# Patient Record
Sex: FEMALE | Race: WHITE | NOT HISPANIC OR LATINO | URBAN - METROPOLITAN AREA
[De-identification: names, ages, dates, MRNs, and addresses within clinical notes are randomized per-mention and may not be internally consistent; named-entity substitution may affect disease eponyms.]

---

## 2019-01-01 ENCOUNTER — INPATIENT (INPATIENT)
Facility: HOSPITAL | Age: 74
LOS: 3 days | DRG: 64 | End: 2019-09-15
Attending: NEUROLOGICAL SURGERY | Admitting: NEUROLOGICAL SURGERY
Payer: MEDICARE

## 2019-01-01 VITALS
DIASTOLIC BLOOD PRESSURE: 53 MMHG | HEART RATE: 150 BPM | TEMPERATURE: 96 F | SYSTOLIC BLOOD PRESSURE: 65 MMHG | OXYGEN SATURATION: 92 % | WEIGHT: 207.23 LBS | RESPIRATION RATE: 18 BRPM

## 2019-01-01 VITALS — RESPIRATION RATE: 9 BRPM

## 2019-01-01 DIAGNOSIS — I62.9 NONTRAUMATIC INTRACRANIAL HEMORRHAGE, UNSPECIFIED: ICD-10-CM

## 2019-01-01 DIAGNOSIS — R09.89 OTHER SPECIFIED SYMPTOMS AND SIGNS INVOLVING THE CIRCULATORY AND RESPIRATORY SYSTEMS: ICD-10-CM

## 2019-01-01 LAB
ALBUMIN SERPL ELPH-MCNC: 1.5 G/DL — LOW (ref 3.3–5)
ALBUMIN SERPL ELPH-MCNC: 2.2 G/DL — LOW (ref 3.3–5)
ALP SERPL-CCNC: 68 U/L — SIGNIFICANT CHANGE UP (ref 40–120)
ALP SERPL-CCNC: 91 U/L — SIGNIFICANT CHANGE UP (ref 40–120)
ALT FLD-CCNC: 2778 U/L — HIGH (ref 10–45)
ALT FLD-CCNC: 4691 U/L — HIGH (ref 10–45)
ANION GAP SERPL CALC-SCNC: 14 MMOL/L — SIGNIFICANT CHANGE UP (ref 5–17)
ANION GAP SERPL CALC-SCNC: 16 MMOL/L — SIGNIFICANT CHANGE UP (ref 5–17)
ANION GAP SERPL CALC-SCNC: 18 MMOL/L — HIGH (ref 5–17)
ANION GAP SERPL CALC-SCNC: 20 MMOL/L — HIGH (ref 5–17)
ANION GAP SERPL CALC-SCNC: 9 MMOL/L — SIGNIFICANT CHANGE UP (ref 5–17)
APTT BLD: 47.4 SEC — HIGH (ref 27.5–36.3)
APTT BLD: 48.9 SEC — HIGH (ref 27.5–36.3)
AST SERPL-CCNC: 2272 U/L — HIGH (ref 10–40)
AST SERPL-CCNC: 2430 U/L — HIGH (ref 10–40)
BASE EXCESS BLDA CALC-SCNC: -0.6 MMOL/L — SIGNIFICANT CHANGE UP (ref -2–3)
BASE EXCESS BLDA CALC-SCNC: -10.2 MMOL/L — LOW (ref -2–3)
BASE EXCESS BLDA CALC-SCNC: -2.8 MMOL/L — LOW (ref -2–3)
BASE EXCESS BLDA CALC-SCNC: -6.8 MMOL/L — LOW (ref -2–3)
BASE EXCESS BLDA CALC-SCNC: -7.5 MMOL/L — LOW (ref -2–3)
BILIRUB DIRECT SERPL-MCNC: 0.2 MG/DL — SIGNIFICANT CHANGE UP (ref 0–0.2)
BILIRUB DIRECT SERPL-MCNC: <0.2 MG/DL — SIGNIFICANT CHANGE UP (ref 0–0.2)
BILIRUB INDIRECT FLD-MCNC: 0.2 MG/DL — SIGNIFICANT CHANGE UP (ref 0.2–1)
BILIRUB INDIRECT FLD-MCNC: >0.2 MG/DL — SIGNIFICANT CHANGE UP (ref 0.2–1)
BILIRUB SERPL-MCNC: 0.4 MG/DL — SIGNIFICANT CHANGE UP (ref 0.2–1.2)
BILIRUB SERPL-MCNC: 0.4 MG/DL — SIGNIFICANT CHANGE UP (ref 0.2–1.2)
BLD GP AB SCN SERPL QL: NEGATIVE — SIGNIFICANT CHANGE UP
BUN SERPL-MCNC: 17 MG/DL — SIGNIFICANT CHANGE UP (ref 7–23)
BUN SERPL-MCNC: 18 MG/DL — SIGNIFICANT CHANGE UP (ref 7–23)
BUN SERPL-MCNC: 22 MG/DL — SIGNIFICANT CHANGE UP (ref 7–23)
BUN SERPL-MCNC: 24 MG/DL — HIGH (ref 7–23)
BUN SERPL-MCNC: 24 MG/DL — HIGH (ref 7–23)
CALCIUM SERPL-MCNC: 6.2 MG/DL — CRITICAL LOW (ref 8.4–10.5)
CALCIUM SERPL-MCNC: 6.3 MG/DL — CRITICAL LOW (ref 8.4–10.5)
CALCIUM SERPL-MCNC: 6.4 MG/DL — CRITICAL LOW (ref 8.4–10.5)
CALCIUM SERPL-MCNC: 6.5 MG/DL — CRITICAL LOW (ref 8.4–10.5)
CALCIUM SERPL-MCNC: 7.6 MG/DL — LOW (ref 8.4–10.5)
CHLORIDE SERPL-SCNC: 110 MMOL/L — HIGH (ref 96–108)
CHLORIDE SERPL-SCNC: 118 MMOL/L — HIGH (ref 96–108)
CHLORIDE SERPL-SCNC: 118 MMOL/L — HIGH (ref 96–108)
CHLORIDE SERPL-SCNC: 122 MMOL/L — HIGH (ref 96–108)
CHLORIDE SERPL-SCNC: 126 MMOL/L — HIGH (ref 96–108)
CHOLEST SERPL-MCNC: 112 MG/DL — SIGNIFICANT CHANGE UP (ref 10–199)
CO2 SERPL-SCNC: 13 MMOL/L — LOW (ref 22–31)
CO2 SERPL-SCNC: 14 MMOL/L — LOW (ref 22–31)
CO2 SERPL-SCNC: 15 MMOL/L — LOW (ref 22–31)
CO2 SERPL-SCNC: 22 MMOL/L — SIGNIFICANT CHANGE UP (ref 22–31)
CO2 SERPL-SCNC: 23 MMOL/L — SIGNIFICANT CHANGE UP (ref 22–31)
CREAT SERPL-MCNC: 0.23 MG/DL — LOW (ref 0.5–1.3)
CREAT SERPL-MCNC: 0.28 MG/DL — LOW (ref 0.5–1.3)
CREAT SERPL-MCNC: 0.32 MG/DL — LOW (ref 0.5–1.3)
CREAT SERPL-MCNC: 0.43 MG/DL — LOW (ref 0.5–1.3)
CREAT SERPL-MCNC: 0.55 MG/DL — SIGNIFICANT CHANGE UP (ref 0.5–1.3)
EXTRA SST TUBE: SIGNIFICANT CHANGE UP
GAS PNL BLDA: SIGNIFICANT CHANGE UP
GAS PNL BLDA: SIGNIFICANT CHANGE UP
GLUCOSE BLDC GLUCOMTR-MCNC: 100 MG/DL — HIGH (ref 70–99)
GLUCOSE BLDC GLUCOMTR-MCNC: 148 MG/DL — HIGH (ref 70–99)
GLUCOSE BLDC GLUCOMTR-MCNC: 207 MG/DL — HIGH (ref 70–99)
GLUCOSE BLDC GLUCOMTR-MCNC: 231 MG/DL — HIGH (ref 70–99)
GLUCOSE BLDC GLUCOMTR-MCNC: 328 MG/DL — HIGH (ref 70–99)
GLUCOSE BLDC GLUCOMTR-MCNC: 95 MG/DL — SIGNIFICANT CHANGE UP (ref 70–99)
GLUCOSE SERPL-MCNC: 158 MG/DL — HIGH (ref 70–99)
GLUCOSE SERPL-MCNC: 232 MG/DL — HIGH (ref 70–99)
GLUCOSE SERPL-MCNC: 232 MG/DL — HIGH (ref 70–99)
GLUCOSE SERPL-MCNC: 319 MG/DL — HIGH (ref 70–99)
GLUCOSE SERPL-MCNC: 320 MG/DL — HIGH (ref 70–99)
HBA1C BLD-MCNC: 4.6 % — SIGNIFICANT CHANGE UP (ref 4–5.6)
HCO3 BLDA-SCNC: 14 MMOL/L — LOW (ref 21–28)
HCO3 BLDA-SCNC: 17 MMOL/L — LOW (ref 21–28)
HCO3 BLDA-SCNC: 18 MMOL/L — LOW (ref 21–28)
HCO3 BLDA-SCNC: 20 MMOL/L — LOW (ref 21–28)
HCO3 BLDA-SCNC: 22 MMOL/L — SIGNIFICANT CHANGE UP (ref 21–28)
HCT VFR BLD CALC: 19.4 % — CRITICAL LOW (ref 34.5–45)
HCT VFR BLD CALC: 30 % — LOW (ref 34.5–45)
HCT VFR BLD CALC: 34 % — LOW (ref 34.5–45)
HCT VFR BLD CALC: 37.3 % — SIGNIFICANT CHANGE UP (ref 34.5–45)
HCV AB S/CO SERPL IA: 0.18 S/CO — SIGNIFICANT CHANGE UP
HCV AB SERPL-IMP: SIGNIFICANT CHANGE UP
HDLC SERPL-MCNC: 34 MG/DL — LOW
HGB BLD-MCNC: 11.3 G/DL — LOW (ref 11.5–15.5)
HGB BLD-MCNC: 12.1 G/DL — SIGNIFICANT CHANGE UP (ref 11.5–15.5)
HGB BLD-MCNC: 6 G/DL — CRITICAL LOW (ref 11.5–15.5)
HGB BLD-MCNC: 9.6 G/DL — LOW (ref 11.5–15.5)
INR BLD: 2.41 — HIGH (ref 0.88–1.16)
INR BLD: 2.44 — HIGH (ref 0.88–1.16)
LACTATE SERPL-SCNC: 11.5 MMOL/L — CRITICAL HIGH (ref 0.5–2)
LACTATE SERPL-SCNC: 2.2 MMOL/L — HIGH (ref 0.5–2)
LACTATE SERPL-SCNC: 4.1 MMOL/L — CRITICAL HIGH (ref 0.5–2)
LACTATE SERPL-SCNC: 6.8 MMOL/L — CRITICAL HIGH (ref 0.5–2)
LACTATE SERPL-SCNC: 8.4 MMOL/L — CRITICAL HIGH (ref 0.5–2)
LACTATE SERPL-SCNC: 9.6 MMOL/L — CRITICAL HIGH (ref 0.5–2)
LIPID PNL WITH DIRECT LDL SERPL: 62 MG/DL — SIGNIFICANT CHANGE UP
MAGNESIUM SERPL-MCNC: 2.2 MG/DL — SIGNIFICANT CHANGE UP (ref 1.6–2.6)
MAGNESIUM SERPL-MCNC: 3.2 MG/DL — HIGH (ref 1.6–2.6)
MAGNESIUM SERPL-MCNC: 3.5 MG/DL — HIGH (ref 1.6–2.6)
MAGNESIUM SERPL-MCNC: 3.9 MG/DL — HIGH (ref 1.6–2.6)
MCHC RBC-ENTMCNC: 29.4 PG — SIGNIFICANT CHANGE UP (ref 27–34)
MCHC RBC-ENTMCNC: 29.9 PG — SIGNIFICANT CHANGE UP (ref 27–34)
MCHC RBC-ENTMCNC: 30.1 PG — SIGNIFICANT CHANGE UP (ref 27–34)
MCHC RBC-ENTMCNC: 30.6 PG — SIGNIFICANT CHANGE UP (ref 27–34)
MCHC RBC-ENTMCNC: 30.9 GM/DL — LOW (ref 32–36)
MCHC RBC-ENTMCNC: 32 GM/DL — SIGNIFICANT CHANGE UP (ref 32–36)
MCHC RBC-ENTMCNC: 32.4 GM/DL — SIGNIFICANT CHANGE UP (ref 32–36)
MCHC RBC-ENTMCNC: 33.2 GM/DL — SIGNIFICANT CHANGE UP (ref 32–36)
MCV RBC AUTO: 90.7 FL — SIGNIFICANT CHANGE UP (ref 80–100)
MCV RBC AUTO: 92 FL — SIGNIFICANT CHANGE UP (ref 80–100)
MCV RBC AUTO: 94.2 FL — SIGNIFICANT CHANGE UP (ref 80–100)
MCV RBC AUTO: 96.5 FL — SIGNIFICANT CHANGE UP (ref 80–100)
NRBC # BLD: 0 /100 WBCS — SIGNIFICANT CHANGE UP (ref 0–0)
NRBC # BLD: 1 /100 WBCS — HIGH (ref 0–0)
PCO2 BLDA: 26 MMHG — LOW (ref 32–45)
PCO2 BLDA: 27 MMHG — LOW (ref 32–45)
PCO2 BLDA: 28 MMHG — LOW (ref 32–45)
PCO2 BLDA: 30 MMHG — LOW (ref 32–45)
PCO2 BLDA: 40 MMHG — SIGNIFICANT CHANGE UP (ref 32–45)
PH BLDA: 7.29 — LOW (ref 7.35–7.45)
PH BLDA: 7.36 — SIGNIFICANT CHANGE UP (ref 7.35–7.45)
PH BLDA: 7.39 — SIGNIFICANT CHANGE UP (ref 7.35–7.45)
PH BLDA: 7.47 — HIGH (ref 7.35–7.45)
PH BLDA: 7.49 — HIGH (ref 7.35–7.45)
PHOSPHATE SERPL-MCNC: 1.3 MG/DL — LOW (ref 2.5–4.5)
PHOSPHATE SERPL-MCNC: 1.5 MG/DL — LOW (ref 2.5–4.5)
PHOSPHATE SERPL-MCNC: 3.9 MG/DL — SIGNIFICANT CHANGE UP (ref 2.5–4.5)
PHOSPHATE SERPL-MCNC: 5.7 MG/DL — HIGH (ref 2.5–4.5)
PLATELET # BLD AUTO: 103 K/UL — LOW (ref 150–400)
PLATELET # BLD AUTO: 153 K/UL — SIGNIFICANT CHANGE UP (ref 150–400)
PLATELET # BLD AUTO: 190 K/UL — SIGNIFICANT CHANGE UP (ref 150–400)
PLATELET # BLD AUTO: 196 K/UL — SIGNIFICANT CHANGE UP (ref 150–400)
PO2 BLDA: 116 MMHG — HIGH (ref 83–108)
PO2 BLDA: 156 MMHG — HIGH (ref 83–108)
PO2 BLDA: 276 MMHG — HIGH (ref 83–108)
PO2 BLDA: 69 MMHG — LOW (ref 83–108)
PO2 BLDA: 72 MMHG — LOW (ref 83–108)
POTASSIUM SERPL-MCNC: 3.1 MMOL/L — LOW (ref 3.5–5.3)
POTASSIUM SERPL-MCNC: 3.2 MMOL/L — LOW (ref 3.5–5.3)
POTASSIUM SERPL-MCNC: 4 MMOL/L — SIGNIFICANT CHANGE UP (ref 3.5–5.3)
POTASSIUM SERPL-MCNC: 4.1 MMOL/L — SIGNIFICANT CHANGE UP (ref 3.5–5.3)
POTASSIUM SERPL-MCNC: 4.4 MMOL/L — SIGNIFICANT CHANGE UP (ref 3.5–5.3)
POTASSIUM SERPL-SCNC: 3.1 MMOL/L — LOW (ref 3.5–5.3)
POTASSIUM SERPL-SCNC: 3.2 MMOL/L — LOW (ref 3.5–5.3)
POTASSIUM SERPL-SCNC: 4 MMOL/L — SIGNIFICANT CHANGE UP (ref 3.5–5.3)
POTASSIUM SERPL-SCNC: 4.1 MMOL/L — SIGNIFICANT CHANGE UP (ref 3.5–5.3)
POTASSIUM SERPL-SCNC: 4.4 MMOL/L — SIGNIFICANT CHANGE UP (ref 3.5–5.3)
PROT SERPL-MCNC: 3.1 G/DL — LOW (ref 6–8.3)
PROT SERPL-MCNC: 4.1 G/DL — LOW (ref 6–8.3)
PROTHROM AB SERPL-ACNC: 28 SEC — HIGH (ref 10–12.9)
PROTHROM AB SERPL-ACNC: 28.3 SEC — HIGH (ref 10–12.9)
RBC # BLD: 2.01 M/UL — LOW (ref 3.8–5.2)
RBC # BLD: 3.26 M/UL — LOW (ref 3.8–5.2)
RBC # BLD: 3.75 M/UL — LOW (ref 3.8–5.2)
RBC # BLD: 3.96 M/UL — SIGNIFICANT CHANGE UP (ref 3.8–5.2)
RBC # FLD: 14.7 % — HIGH (ref 10.3–14.5)
RBC # FLD: 14.8 % — HIGH (ref 10.3–14.5)
RBC # FLD: 15.1 % — HIGH (ref 10.3–14.5)
RBC # FLD: 15.9 % — HIGH (ref 10.3–14.5)
RH IG SCN BLD-IMP: POSITIVE — SIGNIFICANT CHANGE UP
SAO2 % BLDA: 93 % — LOW (ref 95–100)
SAO2 % BLDA: 96 % — SIGNIFICANT CHANGE UP (ref 95–100)
SAO2 % BLDA: 98 % — SIGNIFICANT CHANGE UP (ref 95–100)
SAO2 % BLDA: 99 % — SIGNIFICANT CHANGE UP (ref 95–100)
SAO2 % BLDA: 99 % — SIGNIFICANT CHANGE UP (ref 95–100)
SODIUM SERPL-SCNC: 148 MMOL/L — HIGH (ref 135–145)
SODIUM SERPL-SCNC: 150 MMOL/L — HIGH (ref 135–145)
SODIUM SERPL-SCNC: 151 MMOL/L — HIGH (ref 135–145)
SODIUM SERPL-SCNC: 153 MMOL/L — HIGH (ref 135–145)
SODIUM SERPL-SCNC: 156 MMOL/L — HIGH (ref 135–145)
TOTAL CHOLESTEROL/HDL RATIO MEASUREMENT: 3.3 RATIO — SIGNIFICANT CHANGE UP (ref 3.3–7.1)
TRIGL SERPL-MCNC: 78 MG/DL — SIGNIFICANT CHANGE UP (ref 10–149)
TSH SERPL-MCNC: 0.51 UIU/ML — SIGNIFICANT CHANGE UP (ref 0.35–4.94)
WBC # BLD: 27.03 K/UL — HIGH (ref 3.8–10.5)
WBC # BLD: 29.36 K/UL — HIGH (ref 3.8–10.5)
WBC # BLD: 35.81 K/UL — HIGH (ref 3.8–10.5)
WBC # BLD: 37.62 K/UL — HIGH (ref 3.8–10.5)
WBC # FLD AUTO: 27.03 K/UL — HIGH (ref 3.8–10.5)
WBC # FLD AUTO: 29.36 K/UL — HIGH (ref 3.8–10.5)
WBC # FLD AUTO: 35.81 K/UL — HIGH (ref 3.8–10.5)
WBC # FLD AUTO: 37.62 K/UL — HIGH (ref 3.8–10.5)

## 2019-01-01 PROCEDURE — 99232 SBSQ HOSP IP/OBS MODERATE 35: CPT

## 2019-01-01 PROCEDURE — 99291 CRITICAL CARE FIRST HOUR: CPT | Mod: 24

## 2019-01-01 PROCEDURE — 99223 1ST HOSP IP/OBS HIGH 75: CPT

## 2019-01-01 PROCEDURE — 71045 X-RAY EXAM CHEST 1 VIEW: CPT | Mod: 26

## 2019-01-01 PROCEDURE — 99292 CRITICAL CARE ADDL 30 MIN: CPT | Mod: 24

## 2019-01-01 PROCEDURE — 99232 SBSQ HOSP IP/OBS MODERATE 35: CPT | Mod: 25

## 2019-01-01 PROCEDURE — 70450 CT HEAD/BRAIN W/O DYE: CPT | Mod: 26

## 2019-01-01 PROCEDURE — 99222 1ST HOSP IP/OBS MODERATE 55: CPT

## 2019-01-01 PROCEDURE — 36556 INSERT NON-TUNNEL CV CATH: CPT

## 2019-01-01 RX ORDER — DOPAMINE HYDROCHLORIDE 40 MG/ML
1 INJECTION, SOLUTION, CONCENTRATE INTRAVENOUS
Qty: 400 | Refills: 0 | Status: DISCONTINUED | OUTPATIENT
Start: 2019-01-01 | End: 2019-01-01

## 2019-01-01 RX ORDER — VANCOMYCIN HCL 1 G
1000 VIAL (EA) INTRAVENOUS ONCE
Refills: 0 | Status: DISCONTINUED | OUTPATIENT
Start: 2019-01-01 | End: 2019-01-01

## 2019-01-01 RX ORDER — NOREPINEPHRINE BITARTRATE/D5W 8 MG/250ML
0.05 PLASTIC BAG, INJECTION (ML) INTRAVENOUS
Qty: 32 | Refills: 0 | Status: DISCONTINUED | OUTPATIENT
Start: 2019-01-01 | End: 2019-01-01

## 2019-01-01 RX ORDER — NOREPINEPHRINE BITARTRATE/D5W 8 MG/250ML
0.05 PLASTIC BAG, INJECTION (ML) INTRAVENOUS
Qty: 8 | Refills: 0 | Status: DISCONTINUED | OUTPATIENT
Start: 2019-01-01 | End: 2019-01-01

## 2019-01-01 RX ORDER — SODIUM CHLORIDE 9 MG/ML
1000 INJECTION INTRAMUSCULAR; INTRAVENOUS; SUBCUTANEOUS ONCE
Refills: 0 | Status: COMPLETED | OUTPATIENT
Start: 2019-01-01 | End: 2019-01-01

## 2019-01-01 RX ORDER — CALCIUM GLUCONATE 100 MG/ML
2 VIAL (ML) INTRAVENOUS ONCE
Refills: 0 | Status: COMPLETED | OUTPATIENT
Start: 2019-01-01 | End: 2019-01-01

## 2019-01-01 RX ORDER — ACETAMINOPHEN 500 MG
650 TABLET ORAL ONCE
Refills: 0 | Status: DISCONTINUED | OUTPATIENT
Start: 2019-01-01 | End: 2019-01-01

## 2019-01-01 RX ORDER — LEVETIRACETAM 250 MG/1
1000 TABLET, FILM COATED ORAL EVERY 12 HOURS
Refills: 0 | Status: DISCONTINUED | OUTPATIENT
Start: 2019-01-01 | End: 2019-01-01

## 2019-01-01 RX ORDER — VANCOMYCIN HCL 1 G
VIAL (EA) INTRAVENOUS
Refills: 0 | Status: DISCONTINUED | OUTPATIENT
Start: 2019-01-01 | End: 2019-01-01

## 2019-01-01 RX ORDER — POTASSIUM PHOSPHATE, MONOBASIC POTASSIUM PHOSPHATE, DIBASIC 236; 224 MG/ML; MG/ML
30 INJECTION, SOLUTION INTRAVENOUS ONCE
Refills: 0 | Status: COMPLETED | OUTPATIENT
Start: 2019-01-01 | End: 2019-01-01

## 2019-01-01 RX ORDER — NOREPINEPHRINE BITARTRATE/D5W 8 MG/250ML
2.8 PLASTIC BAG, INJECTION (ML) INTRAVENOUS
Qty: 32 | Refills: 0 | Status: DISCONTINUED | OUTPATIENT
Start: 2019-01-01 | End: 2019-01-01

## 2019-01-01 RX ORDER — INSULIN LISPRO 100/ML
VIAL (ML) SUBCUTANEOUS EVERY 6 HOURS
Refills: 0 | Status: DISCONTINUED | OUTPATIENT
Start: 2019-01-01 | End: 2019-01-01

## 2019-01-01 RX ORDER — VANCOMYCIN HCL 1 G
1000 VIAL (EA) INTRAVENOUS EVERY 12 HOURS
Refills: 0 | Status: DISCONTINUED | OUTPATIENT
Start: 2019-01-01 | End: 2019-01-01

## 2019-01-01 RX ORDER — CHLORHEXIDINE GLUCONATE 213 G/1000ML
15 SOLUTION TOPICAL EVERY 12 HOURS
Refills: 0 | Status: DISCONTINUED | OUTPATIENT
Start: 2019-01-01 | End: 2019-01-01

## 2019-01-01 RX ORDER — SODIUM CHLORIDE 9 MG/ML
1000 INJECTION, SOLUTION INTRAVENOUS
Refills: 0 | Status: DISCONTINUED | OUTPATIENT
Start: 2019-01-01 | End: 2019-01-01

## 2019-01-01 RX ORDER — ONDANSETRON 8 MG/1
4 TABLET, FILM COATED ORAL EVERY 6 HOURS
Refills: 0 | Status: DISCONTINUED | OUTPATIENT
Start: 2019-01-01 | End: 2019-01-01

## 2019-01-01 RX ORDER — PHYTONADIONE (VIT K1) 5 MG
10 TABLET ORAL ONCE
Refills: 0 | Status: COMPLETED | OUTPATIENT
Start: 2019-01-01 | End: 2019-01-01

## 2019-01-01 RX ORDER — PHENYLEPHRINE HYDROCHLORIDE 10 MG/ML
1 INJECTION INTRAVENOUS
Qty: 40 | Refills: 0 | Status: DISCONTINUED | OUTPATIENT
Start: 2019-01-01 | End: 2019-01-01

## 2019-01-01 RX ORDER — SODIUM CHLORIDE 9 MG/ML
1000 INJECTION INTRAMUSCULAR; INTRAVENOUS; SUBCUTANEOUS
Refills: 0 | Status: DISCONTINUED | OUTPATIENT
Start: 2019-01-01 | End: 2019-01-01

## 2019-01-01 RX ORDER — GLUCAGON INJECTION, SOLUTION 0.5 MG/.1ML
1 INJECTION, SOLUTION SUBCUTANEOUS ONCE
Refills: 0 | Status: DISCONTINUED | OUTPATIENT
Start: 2019-01-01 | End: 2019-01-01

## 2019-01-01 RX ORDER — DEXTROSE 50 % IN WATER 50 %
12.5 SYRINGE (ML) INTRAVENOUS ONCE
Refills: 0 | Status: DISCONTINUED | OUTPATIENT
Start: 2019-01-01 | End: 2019-01-01

## 2019-01-01 RX ORDER — DEXTROSE 50 % IN WATER 50 %
15 SYRINGE (ML) INTRAVENOUS ONCE
Refills: 0 | Status: DISCONTINUED | OUTPATIENT
Start: 2019-01-01 | End: 2019-01-01

## 2019-01-01 RX ADMIN — Medication 200 GRAM(S): at 00:32

## 2019-01-01 RX ADMIN — CHLORHEXIDINE GLUCONATE 15 MILLILITER(S): 213 SOLUTION TOPICAL at 18:36

## 2019-01-01 RX ADMIN — PHENYLEPHRINE HYDROCHLORIDE 35.25 MICROGRAM(S)/KG/MIN: 10 INJECTION INTRAVENOUS at 15:15

## 2019-01-01 RX ADMIN — Medication 246.75 MICROGRAM(S)/KG/MIN: at 07:37

## 2019-01-01 RX ADMIN — PHENYLEPHRINE HYDROCHLORIDE 35.25 MICROGRAM(S)/KG/MIN: 10 INJECTION INTRAVENOUS at 17:34

## 2019-01-01 RX ADMIN — LEVETIRACETAM 400 MILLIGRAM(S): 250 TABLET, FILM COATED ORAL at 05:41

## 2019-01-01 RX ADMIN — Medication 246.75 MICROGRAM(S)/KG/MIN: at 03:47

## 2019-01-01 RX ADMIN — Medication 246.75 MICROGRAM(S)/KG/MIN: at 00:01

## 2019-01-01 RX ADMIN — Medication 4.41 MICROGRAM(S)/KG/MIN: at 00:43

## 2019-01-01 RX ADMIN — DOPAMINE HYDROCHLORIDE 3.52 MICROGRAM(S)/KG/MIN: 40 INJECTION, SOLUTION, CONCENTRATE INTRAVENOUS at 21:55

## 2019-01-01 RX ADMIN — LEVETIRACETAM 400 MILLIGRAM(S): 250 TABLET, FILM COATED ORAL at 05:04

## 2019-01-01 RX ADMIN — PHENYLEPHRINE HYDROCHLORIDE 35.25 MICROGRAM(S)/KG/MIN: 10 INJECTION INTRAVENOUS at 06:39

## 2019-01-01 RX ADMIN — CHLORHEXIDINE GLUCONATE 15 MILLILITER(S): 213 SOLUTION TOPICAL at 05:04

## 2019-01-01 RX ADMIN — PHENYLEPHRINE HYDROCHLORIDE 35.25 MICROGRAM(S)/KG/MIN: 10 INJECTION INTRAVENOUS at 09:11

## 2019-01-01 RX ADMIN — DOPAMINE HYDROCHLORIDE 3.52 MICROGRAM(S)/KG/MIN: 40 INJECTION, SOLUTION, CONCENTRATE INTRAVENOUS at 06:18

## 2019-01-01 RX ADMIN — DOPAMINE HYDROCHLORIDE 3.52 MICROGRAM(S)/KG/MIN: 40 INJECTION, SOLUTION, CONCENTRATE INTRAVENOUS at 10:11

## 2019-01-01 RX ADMIN — PHENYLEPHRINE HYDROCHLORIDE 35.25 MICROGRAM(S)/KG/MIN: 10 INJECTION INTRAVENOUS at 12:05

## 2019-01-01 RX ADMIN — POTASSIUM PHOSPHATE, MONOBASIC POTASSIUM PHOSPHATE, DIBASIC 83.33 MILLIMOLE(S): 236; 224 INJECTION, SOLUTION INTRAVENOUS at 00:33

## 2019-01-01 RX ADMIN — DOPAMINE HYDROCHLORIDE 3.52 MICROGRAM(S)/KG/MIN: 40 INJECTION, SOLUTION, CONCENTRATE INTRAVENOUS at 17:23

## 2019-01-01 RX ADMIN — CHLORHEXIDINE GLUCONATE 15 MILLILITER(S): 213 SOLUTION TOPICAL at 17:23

## 2019-01-01 RX ADMIN — LEVETIRACETAM 400 MILLIGRAM(S): 250 TABLET, FILM COATED ORAL at 06:03

## 2019-01-01 RX ADMIN — DOPAMINE HYDROCHLORIDE 3.52 MICROGRAM(S)/KG/MIN: 40 INJECTION, SOLUTION, CONCENTRATE INTRAVENOUS at 02:27

## 2019-01-01 RX ADMIN — Medication 200 GRAM(S): at 09:32

## 2019-01-01 RX ADMIN — CHLORHEXIDINE GLUCONATE 15 MILLILITER(S): 213 SOLUTION TOPICAL at 17:15

## 2019-01-01 RX ADMIN — PHENYLEPHRINE HYDROCHLORIDE 35.25 MICROGRAM(S)/KG/MIN: 10 INJECTION INTRAVENOUS at 04:00

## 2019-01-01 RX ADMIN — Medication 246.75 MICROGRAM(S)/KG/MIN: at 10:04

## 2019-01-01 RX ADMIN — Medication 200 GRAM(S): at 12:52

## 2019-01-01 RX ADMIN — DOPAMINE HYDROCHLORIDE 3.52 MICROGRAM(S)/KG/MIN: 40 INJECTION, SOLUTION, CONCENTRATE INTRAVENOUS at 06:07

## 2019-01-01 RX ADMIN — DOPAMINE HYDROCHLORIDE 3.52 MICROGRAM(S)/KG/MIN: 40 INJECTION, SOLUTION, CONCENTRATE INTRAVENOUS at 12:06

## 2019-01-01 RX ADMIN — PHENYLEPHRINE HYDROCHLORIDE 35.25 MICROGRAM(S)/KG/MIN: 10 INJECTION INTRAVENOUS at 00:50

## 2019-01-01 RX ADMIN — PHENYLEPHRINE HYDROCHLORIDE 35.25 MICROGRAM(S)/KG/MIN: 10 INJECTION INTRAVENOUS at 09:24

## 2019-01-01 RX ADMIN — PHENYLEPHRINE HYDROCHLORIDE 35.25 MICROGRAM(S)/KG/MIN: 10 INJECTION INTRAVENOUS at 18:15

## 2019-01-01 RX ADMIN — CHLORHEXIDINE GLUCONATE 15 MILLILITER(S): 213 SOLUTION TOPICAL at 06:03

## 2019-01-01 RX ADMIN — DOPAMINE HYDROCHLORIDE 3.52 MICROGRAM(S)/KG/MIN: 40 INJECTION, SOLUTION, CONCENTRATE INTRAVENOUS at 16:08

## 2019-01-01 RX ADMIN — Medication 246.75 MICROGRAM(S)/KG/MIN: at 23:35

## 2019-01-01 RX ADMIN — Medication 102 MILLIGRAM(S): at 23:46

## 2019-01-01 RX ADMIN — LEVETIRACETAM 400 MILLIGRAM(S): 250 TABLET, FILM COATED ORAL at 22:58

## 2019-01-01 RX ADMIN — PHENYLEPHRINE HYDROCHLORIDE 35.25 MICROGRAM(S)/KG/MIN: 10 INJECTION INTRAVENOUS at 15:00

## 2019-01-01 RX ADMIN — PHENYLEPHRINE HYDROCHLORIDE 35.25 MICROGRAM(S)/KG/MIN: 10 INJECTION INTRAVENOUS at 12:03

## 2019-01-01 RX ADMIN — DOPAMINE HYDROCHLORIDE 3.52 MICROGRAM(S)/KG/MIN: 40 INJECTION, SOLUTION, CONCENTRATE INTRAVENOUS at 05:39

## 2019-01-01 RX ADMIN — LEVETIRACETAM 400 MILLIGRAM(S): 250 TABLET, FILM COATED ORAL at 17:35

## 2019-01-01 RX ADMIN — DOPAMINE HYDROCHLORIDE 3.52 MICROGRAM(S)/KG/MIN: 40 INJECTION, SOLUTION, CONCENTRATE INTRAVENOUS at 14:33

## 2019-01-01 RX ADMIN — Medication 246.75 MICROGRAM(S)/KG/MIN: at 12:32

## 2019-01-01 RX ADMIN — Medication 8: at 06:20

## 2019-01-01 RX ADMIN — PHENYLEPHRINE HYDROCHLORIDE 35.25 MICROGRAM(S)/KG/MIN: 10 INJECTION INTRAVENOUS at 05:23

## 2019-01-01 RX ADMIN — Medication 246.75 MICROGRAM(S)/KG/MIN: at 08:15

## 2019-01-01 RX ADMIN — SODIUM CHLORIDE 1000 MILLILITER(S): 9 INJECTION INTRAMUSCULAR; INTRAVENOUS; SUBCUTANEOUS at 10:30

## 2019-01-01 RX ADMIN — PHENYLEPHRINE HYDROCHLORIDE 35.25 MICROGRAM(S)/KG/MIN: 10 INJECTION INTRAVENOUS at 20:47

## 2019-01-01 RX ADMIN — PHENYLEPHRINE HYDROCHLORIDE 35.25 MICROGRAM(S)/KG/MIN: 10 INJECTION INTRAVENOUS at 21:56

## 2019-01-01 RX ADMIN — Medication 246.75 MICROGRAM(S)/KG/MIN: at 17:24

## 2019-01-01 RX ADMIN — DOPAMINE HYDROCHLORIDE 3.52 MICROGRAM(S)/KG/MIN: 40 INJECTION, SOLUTION, CONCENTRATE INTRAVENOUS at 22:17

## 2019-01-01 RX ADMIN — DOPAMINE HYDROCHLORIDE 3.52 MICROGRAM(S)/KG/MIN: 40 INJECTION, SOLUTION, CONCENTRATE INTRAVENOUS at 22:20

## 2019-01-01 RX ADMIN — Medication 246.75 MICROGRAM(S)/KG/MIN: at 06:47

## 2019-01-01 RX ADMIN — Medication 4: at 11:15

## 2019-01-01 RX ADMIN — Medication 246.75 MICROGRAM(S)/KG/MIN: at 18:15

## 2019-01-01 RX ADMIN — SODIUM CHLORIDE 1000 MILLILITER(S): 9 INJECTION INTRAMUSCULAR; INTRAVENOUS; SUBCUTANEOUS at 18:00

## 2019-01-01 RX ADMIN — Medication 246.75 MICROGRAM(S)/KG/MIN: at 03:37

## 2019-01-01 RX ADMIN — CHLORHEXIDINE GLUCONATE 15 MILLILITER(S): 213 SOLUTION TOPICAL at 05:14

## 2019-01-01 RX ADMIN — PHENYLEPHRINE HYDROCHLORIDE 35.25 MICROGRAM(S)/KG/MIN: 10 INJECTION INTRAVENOUS at 02:27

## 2019-01-01 RX ADMIN — PHENYLEPHRINE HYDROCHLORIDE 35.25 MICROGRAM(S)/KG/MIN: 10 INJECTION INTRAVENOUS at 14:11

## 2019-01-01 RX ADMIN — LEVETIRACETAM 400 MILLIGRAM(S): 250 TABLET, FILM COATED ORAL at 17:15

## 2019-01-01 RX ADMIN — CHLORHEXIDINE GLUCONATE 15 MILLILITER(S): 213 SOLUTION TOPICAL at 05:41

## 2019-01-01 RX ADMIN — DOPAMINE HYDROCHLORIDE 3.52 MICROGRAM(S)/KG/MIN: 40 INJECTION, SOLUTION, CONCENTRATE INTRAVENOUS at 10:50

## 2019-01-01 RX ADMIN — PHENYLEPHRINE HYDROCHLORIDE 35.25 MICROGRAM(S)/KG/MIN: 10 INJECTION INTRAVENOUS at 12:00

## 2019-01-01 RX ADMIN — Medication 246.75 MICROGRAM(S)/KG/MIN: at 01:00

## 2019-01-01 RX ADMIN — PHENYLEPHRINE HYDROCHLORIDE 35.25 MICROGRAM(S)/KG/MIN: 10 INJECTION INTRAVENOUS at 03:09

## 2019-01-01 RX ADMIN — LEVETIRACETAM 400 MILLIGRAM(S): 250 TABLET, FILM COATED ORAL at 05:14

## 2019-01-01 RX ADMIN — PHENYLEPHRINE HYDROCHLORIDE 35.25 MICROGRAM(S)/KG/MIN: 10 INJECTION INTRAVENOUS at 07:43

## 2019-01-01 RX ADMIN — Medication 246.75 MICROGRAM(S)/KG/MIN: at 11:02

## 2019-01-01 RX ADMIN — PHENYLEPHRINE HYDROCHLORIDE 35.25 MICROGRAM(S)/KG/MIN: 10 INJECTION INTRAVENOUS at 16:09

## 2019-01-01 RX ADMIN — PHENYLEPHRINE HYDROCHLORIDE 35.25 MICROGRAM(S)/KG/MIN: 10 INJECTION INTRAVENOUS at 10:05

## 2019-01-01 RX ADMIN — DOPAMINE HYDROCHLORIDE 3.52 MICROGRAM(S)/KG/MIN: 40 INJECTION, SOLUTION, CONCENTRATE INTRAVENOUS at 06:13

## 2019-01-01 RX ADMIN — PHENYLEPHRINE HYDROCHLORIDE 35.25 MICROGRAM(S)/KG/MIN: 10 INJECTION INTRAVENOUS at 23:26

## 2019-01-01 RX ADMIN — Medication 246.75 MICROGRAM(S)/KG/MIN: at 21:26

## 2019-01-01 RX ADMIN — PHENYLEPHRINE HYDROCHLORIDE 35.25 MICROGRAM(S)/KG/MIN: 10 INJECTION INTRAVENOUS at 06:23

## 2019-01-01 RX ADMIN — PHENYLEPHRINE HYDROCHLORIDE 35.25 MICROGRAM(S)/KG/MIN: 10 INJECTION INTRAVENOUS at 06:14

## 2019-01-01 RX ADMIN — PHENYLEPHRINE HYDROCHLORIDE 35.25 MICROGRAM(S)/KG/MIN: 10 INJECTION INTRAVENOUS at 02:18

## 2019-01-01 RX ADMIN — Medication 246.75 MICROGRAM(S)/KG/MIN: at 14:37

## 2019-01-01 RX ADMIN — DOPAMINE HYDROCHLORIDE 3.52 MICROGRAM(S)/KG/MIN: 40 INJECTION, SOLUTION, CONCENTRATE INTRAVENOUS at 16:06

## 2019-01-01 RX ADMIN — Medication 246.75 MICROGRAM(S)/KG/MIN: at 14:32

## 2019-01-01 RX ADMIN — DOPAMINE HYDROCHLORIDE 3.52 MICROGRAM(S)/KG/MIN: 40 INJECTION, SOLUTION, CONCENTRATE INTRAVENOUS at 07:42

## 2019-01-01 RX ADMIN — Medication 246.75 MICROGRAM(S)/KG/MIN: at 09:24

## 2019-01-01 RX ADMIN — PHENYLEPHRINE HYDROCHLORIDE 35.25 MICROGRAM(S)/KG/MIN: 10 INJECTION INTRAVENOUS at 21:55

## 2019-01-01 RX ADMIN — LEVETIRACETAM 400 MILLIGRAM(S): 250 TABLET, FILM COATED ORAL at 18:36

## 2019-09-11 NOTE — PROCEDURE NOTE - ADDITIONAL PROCEDURE DETAILS
Attempted right groin stick for Arterial Line monitoring. Wire unable to pass. Attempted to reposition. Taken out and attempted right axillae.

## 2019-09-11 NOTE — H&P ADULT - ATTENDING COMMENTS
Patient seen and examined by me. She has suffered a devastating intracerebral hemorrhage. There is also intraventricular hemorrhage. There is massive brain compression. CT scan and examination is consistent with brain herniation. Clinical examination is consistent with brain death. Plan for initiation of brain death protocol testing per neuroICU. Neurosurgical intervention would be futile.    Arnie Kitchen M.D.

## 2019-09-11 NOTE — PROVIDER CONTACT NOTE (OTHER) - SITUATION
Pt on levophed 1.1 mcg/kg/min and dopamine 20 mcg/kg/min and phenylephrine 1.5 mcg/kg/min to maintain SBP >100

## 2019-09-11 NOTE — PROCEDURE NOTE - SUPERVISORY STATEMENT
The procedure was done in the patient who was hypotensive, on dopamine, levophed and phenylephrine.  She had a large ICH hematoma with fixed and dilated pupils and unresponsive.

## 2019-09-11 NOTE — H&P ADULT - NSHPPHYSICALEXAM_GEN_ALL_CORE
intubated, off sedation,   does not open eyes, does not follow commands,   b/l pupils 5-6mm, fixed non-reactive, no corneal, no gag, not overbreathing the vent,  no withdrawal in all 4 extr to noxious,  no grimacing to noxious,

## 2019-09-11 NOTE — H&P ADULT - HISTORY OF PRESENT ILLNESS
Pt is 73yo female, PMH: dementia, HTN, MS, seizure disorder, had a seizure today, found unresponsive, brought to OSH, intubated, HCT: Large IPH R hemisphere with midline shift to the left, GCS 4.  Pt was transferred to [facility] as per family wishes, on arrival unable to obtain BP, trace pulse on palpation, code blue was called Pt is 73yo female, PMH: dementia, HTN, MS, seizure disorder, had a seizure today, found unresponsive, brought to OSH, intubated, HCT: Large IPH R hemisphere with midline shift to the left, GCS 4.  Pt was transferred to [facility] as per family wishes, on arrival : b/l pupils 5-6mm fixed non-reactive, no corneal,  unable to obtain BP, trace pulse on palpation, code blue was called. Pt is 75yo female, PMH: dementia, HTN, MS, seizure disorder, had a seizure today, found unresponsive, brought to OSH, intubated, HCT: Large IPH R hemisphere with midline shift to the left, GCS 4.  Pt was transferred to [facility] as per family wishes, on arrival with no sedation : b/l pupils 5-6mm fixed non-reactive, no corneal b/l, no gag, not overbreathing vent, no withdrawal to noxious in all 4 extr, unable to obtain BP, trace pulse on palpation in carotid, code blue was called, FS 230s, maxed out Levophed/Dopamine/Neosynephrine drips, bolus IVF 2L, attempted R femoral Dayami unsuccessfully, Right axillary A-line placed successfully, has 4 IV peripheral line, + garcia placed at OSH, HCT from outside hospital was reviewed with Dr. Kitchen. Based on HCT (from outside hospital) and patient's exam- not consistent with Pt is 75yo female, PMH: dementia, HTN, MS, seizure disorder, had a seizure today, found unresponsive, brought to OSH, intubated, HCT: Large IPH R hemisphere with midline shift to the left, GCS 4.  Pt was transferred to [facility] as per family wishes, on arrival with no sedation : b/l pupils 5-6mm fixed non-reactive, no corneal b/l, no gag, not overbreathing vent, no withdrawal to noxious in all 4 extr, unable to obtain BP, trace pulse on palpation in carotid, code blue was called, FS 230s, maxed out Levophed/Dopamine/Neosynephrine drips, bolus IVF 2L, attempted R femoral Dayami unsuccessfully, Right axillary A-line placed successfully, has 4 IV peripheral line, + garcia placed at OSH, HCT from outside hospital was reviewed with Dr. Kitchen. Based on HCT (from outside hospital) and patient's clinical exam- consistent with brain death.  Pt is unstable to get Head CT at [facility]. Pt is 75yo female, PMH: dementia, HTN, MS, seizure disorder, had a seizure today, found unresponsive, brought to OSH, intubated, HCT: Large IPH R hemisphere with midline shift to the left, GCS 4.  Pt was transferred to [facility] as per family wishes, on arrival with no sedation : b/l pupils 5-6mm fixed non-reactive, no corneal b/l, no gag, not overbreathing vent, no withdrawal to noxious in all 4 extr, unable to obtain BP, trace pulse on palpation in carotid, code blue was called, FS 230s, maxed out Levophed/Dopamine/Neosynephrine drips, bolus IVF 2L, attempted R femoral Dayami unsuccessfully, Right axillary A-line placed successfully, has 4 IV peripheral line, + garcia placed at OSH, HCT from outside hospital was reviewed with Dr. Kitchen. Based on HCT (from outside hospital) and patient's clinical exam- consistent with brain death.  Pt is unstable to get Head CT at [facility].    ICH Score 5, GCS 4, NIH 38

## 2019-09-11 NOTE — H&P ADULT - ASSESSMENT
Pt is 75yo female, PMH: dementia, h/o seizure d/o, HTN, MS, episode of seizure today, unresponsive, HCT: large right hemisphere IPH & IVH, midline shift

## 2019-09-11 NOTE — PROCEDURE NOTE - NSINDICATIONS_GEN_A_CORE
monitoring purposes/cannulation purposes/critical patient
arterial puncture to obtain ABG's/cannulation purposes

## 2019-09-12 NOTE — PROVIDER CONTACT NOTE (CHANGE IN STATUS NOTIFICATION) - SITUATION
Pt didn't have palpable pulse when receiving from EMS and code blue/RRT called at bedside. However, later very weak pulse felt on left carotid artery

## 2019-09-12 NOTE — PROCEDURE NOTE - NSPROCDETAILS_GEN_ALL_CORE
History of Present Illness - Rylen Scott Marty is a 4 year old male presenting in clinic today for a recheck on his ears. Patient was at his 4 year check up in October, his PCP noticed that the tubes have came out. At this time he did have an ear infection. He did have another in November. Patient's mother is also concerned of enlarged tonsils. Patient is a mouth breather all the time. He does snore and has possible gasping at night. No nighttime enuresis. He does not cough unless he is sick. No strep throat or sore throat. Normal speech.      Past Medical History - No past medical history on file.    Current Medications -   Current Outpatient Prescriptions:      cefdinir (OMNICEF) 250 MG/5ML suspension, Take 4.2 mLs (210 mg) by mouth daily for 10 days, Disp: 42 mL, Rfl: 0    Allergies -   Allergies   Allergen Reactions     Amoxicillin Hives     EM x 2 after 1 week of antibiotics, developed serum sickness the second time\  Can take Omnicef       Social History -   Social History     Social History     Marital status: Single     Spouse name: N/A     Number of children: N/A     Years of education: N/A     Social History Main Topics     Smoking status: Never Smoker     Smokeless tobacco: Never Used     Alcohol use No     Drug use: No     Sexual activity: No     Other Topics Concern     None     Social History Narrative       Family History -   Family History   Problem Relation Age of Onset     Family History Negative No family hx of      MENTAL ILLNESS No family hx of      Breast Cancer No family hx of      Coronary Artery Disease No family hx of      Other Cancer No family hx of      Asthma No family hx of      Thyroid Disease No family hx of        Review of Systems - As per HPI and PMHx, otherwise review of system review of the head and neck negative.    Physical Exam  Pulse 90  Wt 14.9 kg (32 lb 12 oz)  SpO2 99%  BMI: There is no height or weight on file to calculate BMI.    General - The patient is well 
nourished and well developed, and appears to have good nutritional status.  Alert and oriented to person and place, answers questions and cooperates with examination appropriately.    SKIN - No suspicious lesions or rashes.  Respiration - No respiratory distress.     Head and Face - Normocephalic and atraumatic, with no gross asymmetry noted of the contour of the facial features.  The facial nerve is intact, with strong symmetric movements.    Voice and Breathing - The patient was breathing comfortably without the use of accessory muscles. There was no wheezing, stridor, or stertor.  The patients voice was clear and strong, and had appropriate pitch and quality.    Ears - Bilateral pinna and EACs with normal appearing overlying skin. Tympanic membrane intact with good mobility on pneumatic otoscopy bilaterally. Bony landmarks of the ossicular chain are normal. The tympanic membranes are normal in appearance. No perforation or masses.  No purulence was seen in the external canal or the middle ear. Patient had some fluid behind the drum on the right. The left drum is thickened and retracted.  Left retracted    Eyes - Extraocular movements intact.  Sclera were not icteric or injected, conjunctiva were pink and moist.    Mouth - Examination of the oral cavity showed pink, healthy oral mucosa. No lesions or ulcerations noted.  The tongue was mobile and midline, and the dentition were in good condition.      Throat - The walls of the oropharynx were smooth, pink, moist, symmetric, and had no lesions or ulcerations.  The tonsillar pillars and soft palate were symmetric. Tonsils are 4+. The uvula was midline on elevation.    Neck - Normal midline excursion of the laryngotracheal complex during swallowing.  Full range of motion on passive movement.  Palpation of the occipital, submental, submandibular, internal jugular chain, and supraclavicular nodes did not demonstrate any abnormal lymph nodes or masses.  The carotid pulse 
ultrasound guidance/Seldinger technique/connected to a pressurized flush line/location identified, draped/prepped, sterile technique used, needle inserted/introduced/sutured in place/hemostasis with direct pressure, dressing applied/all materials/supplies accounted for at end of procedure/positive blood return obtained via catheter
was palpable bilaterally.  Palpation of the thyroid was soft and smooth, with no nodules or goiter appreciated.  The trachea was mobile and midline.    Nose - External contour is symmetric, no gross deflection or scars.  Nasal mucosa is pink and moist with no abnormal mucus.  The septum was midline and non-obstructive, turbinates of normal size and position.  No polyps, masses, or purulence noted on examination.    Neuro - Nonfocal neuro exam is normal, CN 2 through 12 intact, normal gait and muscle tone.      Performed in clinic today:  No procedures preformed in clinic today          A/P - Rylen Scott Marty is a 4 year old male with enlarged tonsils and ear problems. I will prescribe patient flonase for the next 4 weeks. I suggested replacing the myringotomy tubes. At the next visit we will discuss further treatment options.    Rylen should follow up in in 4 weeks.      At [place] next appointment they will not need a hearing test.      This document serves as a record of the services and decisions personally performed and made by Dr. Sam Shabazz MD. It was created on his behalf by Tawny Aguilar, a trained medical scribe. The creation of this document is based the provider's statements to the medical scribe.  Tawny Aguilar 5:32 PM 11/29/2017    Provider:   The information in this document, created by the medical scribe for me, accurately reflects the services I personally performed and the decisions made by me. I have reviewed and approved this document for accuracy prior to leaving the patient care area.  Dr. Sam Shabazz MD 5:32 PM 11/29/2017    Sam Shabazz MD  Otolaryngology  [facility]        
ultrasound guidance/hemostasis with direct pressure, dressing applied/Seldinger technique/positive blood return obtained via catheter/all materials/supplies accounted for at end of procedure
ultrasound guidance/lumen(s) aspirated and flushed/sterile dressing applied/guidewire recovered/sterile technique, catheter placed

## 2019-09-12 NOTE — CONSULT NOTE ADULT - SUBJECTIVE AND OBJECTIVE BOX
Patient is a 74y old  Female who presents with a chief complaint of acute intracranial hematoma (12 Sep 2019 06:16)        HPI:  Pt is 75yo female, PMH: dementia, HTN, MS, seizure disorder, had a seizure today, found unresponsive, brought to OSH, intubated, HCT: Large IPH R hemisphere with midline shift to the left, GCS 4.  Pt was transferred to [facility] as per family wishes, on arrival with no sedation : b/l pupils 5-6mm fixed non-reactive, no corneal b/l, no gag, not overbreathing vent, no withdrawal to noxious in all 4 extr, unable to obtain BP, trace pulse on palpation in carotid, code blue was called, FS 230s, maxed out Levophed/Dopamine/Neosynephrine drips, bolus IVF 2L, attempted R femoral Elkins unsuccessfully, Right axillary A-line placed successfully, has 4 IV peripheral line, + garcia placed at OSH, HCT from outside hospital was reviewed with Dr. Kitchen. Based on HCT (from outside hospital) and patient's clinical exam- consistent with brain death.  Pt is unstable to get Head CT at [facility].    ICH Score 5, GCS 4, NIH 38 (11 Sep 2019 22:42)     2 episodes of arrest since admission - no clinical seizures   Allergies  ciprofloxacin (Unknown)      Health Issues  INTRACRANIAL HEMORRHAGE  Yes  Handoff  Seizure disorder  Dementia  Multiple sclerosis  HTN (hypertension)  Suspected deep vein thrombosis (DVT)  Intracranial bleed  Insertion, arterial line, percutaneous        FAMILY HISTORY:      MEDICATIONS  (STANDING):  chlorhexidine 0.12% Liquid 15 milliLiter(s) Oral Mucosa every 12 hours  dextrose 5%. 1000 milliLiter(s) (50 mL/Hr) IV Continuous <Continuous>  dextrose 50% Injectable 12.5 Gram(s) IV Push once  DOPamine Infusion 1 MICROgram(s)/kG/Min (3.525 mL/Hr) IV Continuous <Continuous>  insulin lispro (HumaLOG) corrective regimen sliding scale   SubCutaneous every 6 hours  levETIRAcetam  IVPB 1000 milliGRAM(s) IV Intermittent every 12 hours  norepinephrine Infusion 2.8 MICROgram(s)/kG/Min (246.75 mL/Hr) IV Continuous <Continuous>  phenylephrine    Infusion 1 MICROgram(s)/kG/Min (35.25 mL/Hr) IV Continuous <Continuous>    MEDICATIONS  (PRN):  dextrose 40% Gel 15 Gram(s) Oral once PRN Blood Glucose LESS THAN 70 milliGRAM(s)/deciliter  glucagon  Injectable 1 milliGRAM(s) IntraMuscular once PRN Glucose LESS THAN 70 milligrams/deciliter  ondansetron Injectable 4 milliGRAM(s) IV Push every 6 hours PRN Nausea and/or Vomiting      PAST MEDICAL & SURGICAL HISTORY:  Seizure disorder  Dementia  Multiple sclerosis  HTN (hypertension)      Labs                          9.6    29.36 )-----------( 153      ( 12 Sep 2019 05:38 )             30.0     09-12    151<H>  |  122<H>  |  24<H>  ----------------------------<  232<H>  4.1   |  15<L>  |  0.43<L>    Ca    6.2<LL>      12 Sep 2019 10:34  Phos  5.7     09-12  Mg     3.2     09-12    TPro  4.1<L>  /  Alb  2.2<L>  /  TBili  0.4  /  DBili  <0.2  /  AST  2430<H>  /  ALT  2778<H>  /  AlkPhos  68  09-12      Radiology:    Physical Exam    MENTAL STATUS  -Level of Consciousness- comatose          Cranial Nerve 1- 12- no pupillary reaction        MOTOR- no movement to pain      Reflexes- absent        Cerebellar- no abnormal posturing     vascular -    Assessment- ICH     Plan  as per NS

## 2019-09-12 NOTE — PROCEDURE NOTE - NSPOSTCAREGUIDE_GEN_A_CORE
Instructed patient/caregiver to follow-up with primary care physician/Keep the cast/splint/dressing clean and dry/Verbal/written post procedure instructions were given to patient/caregiver/Instructed patient/caregiver regarding signs and symptoms of infection/Care for catheter as per unit/ICU protocols
Verbal/written post procedure instructions were given to patient/caregiver/Keep the cast/splint/dressing clean and dry

## 2019-09-12 NOTE — PROGRESS NOTE ADULT - ASSESSMENT
ASSESSMENT:  74y Female with large Right hemisphere IPH/IVH and midline shift, ICH Score 5, NIH 38, GCS4; death imminent; there is futility of care.       PLAN:  NEURO:  Neuro Checks q1h  Keppra 1q12  HOB>30      CARDIOVASCULAR:  SBO   Levo/Dopam/Neosyn gtt    PULMONARY:  Full Vent Support    RENAL:  IVF    GI:  NPO    HEME:  trend h/h    ID:  leukocytosis  no fevers    ENDO:  ISS    DVT PROPHYLAXIS:  [x] Venodynes                                [] Heparin/Lovenox      DISPOSITION:   full code  ICU status  Organ Donor Notified ASSESSMENT:  74y Female with large Right hemisphere IPH/IVH and midline shift, ICH Score 5, NIH 38, GCS4; death imminent; there is futility of care.     PLAN:  NEURO:  Neuro Checks q1h  Keppra 1q12  HOB>30    CARDIOVASCULAR:  SBP > 65  Levo/Dopam/Neosyn gtt, cap care now. may de-escalate, but do not escalate beyond current dose 19:00 9/12/19    PULMONARY:  Full Vent Support    RENAL:  none    GI:  NPO    HEME:  trend h/h    ID:  leukocytosis  no fevers    ENDO:  ISS    DVT PROPHYLAXIS:  [x] Venodynes                                      DISPOSITION:   DNR  ICU status  Organ Donor Notified

## 2019-09-12 NOTE — PROVIDER CONTACT NOTE (CHANGE IN STATUS NOTIFICATION) - ACTION/TREATMENT ORDERED:
Titrate levophed to 3mcg/kg/min and dopamine to 12 mcg/kg/min. Will continue to monitor
Code blue/RRT at bedside and will continue to monitor. Lab amari.

## 2019-09-12 NOTE — PROVIDER CONTACT NOTE (OTHER) - ACTION/TREATMENT ORDERED:
1 L NS bolus to be given
Will cover with 8 units Humalog and will continue to monitor
no intervention at this time
Will titrate up levophed to 2 mcg/kg/min and titrate down dopamine to maintain SBP > 100 due to HR of 150s per PA Aaron. Will continue to monitor
Will titrate up levophed to 2 mcg/kg/min and titrate down dopamine to maintain SBP > 100 due to HR of 150s per PA Aaron. Will continue to monitor
continue to increase drips to maintain MAP >65
per MD do not cap drips at this time, continue to increase to maintain MAP >65 per family wishes

## 2019-09-12 NOTE — CHART NOTE - NSCHARTNOTEFT_GEN_A_CORE
Neurosurgical Indications for Screening Dopplers on Admission:       1) Known hypercoagulation disorder (h/o VTE, thrombophilia, HIT, etc.)   2) Admitted from prolonged stay from another institution (straight forward ER transfers not included)  3) Presenting with significant leg immobility   4) Presenting with signs and symptoms of VTE?    5) With significant critical illness, Including "found down" for unknown period of time in HPI  6) With significant neurotrauma (TBI, SCI / TLS spine fractures)   7) Who are comatose   8) With known malignancy (e.g. glioblastoma multiforme, meningioma, etc.). Excludes IA chemo 23hr observation stays  9) On hemodialysis   10) Who have received platelet transfusion or antithrombotic reversal agents recently   11) Who have had recent major orthopedic surgery          Screening dopplers indicated?   Y x   N _    DVT Prophylaxis:  _xSCD's   _ chemoprophylaxis

## 2019-09-12 NOTE — PROVIDER CONTACT NOTE (CRITICAL VALUE NOTIFICATION) - SITUATION
lactic acid 4.1
calcium 6.2
lactate 6.8
Lactate 11.5
Lactate 8.4 PA Aaron crystal
Pt Ca+2 was 6.2
Pt lactate 9.6

## 2019-09-13 NOTE — DIETITIAN INITIAL EVALUATION ADULT. - ADD RECOMMEND
1. Nutritional plan in line with goals of care 2. If clinical status changes and plan to start TF, please-reconsult nutrition

## 2019-09-13 NOTE — DIETITIAN INITIAL EVALUATION ADULT. - ENERGY NEEDS
Ht (9/12): 162.6cm. Wt (9/11): 94kg, IBW: 54.5kg +/-10%, %IBW: 173%, BMI: 35.6   IBW used to calculate energy needs due to pt's current body weight exceeding 120% of IBW. Needs adjusted for vent. Fluid needs per team 2/2 hypernatremia.

## 2019-09-13 NOTE — PROGRESS NOTE ADULT - ASSESSMENT
ASSESSMENT:  74y Female with large Right hemisphere IPH/IVH and midline shift, ICH Score 5, NIH 38, GCS4; death imminent; there is futility of care.     PLAN:  NEURO:  Neuro Checks q1h  Keppra 1q12  HOB>30    CARDIOVASCULAR:  SBP > 65  Levo/Dopam/Neosyn gtt, cap care now. may de-escalate, but do not escalate beyond current dose 19:00 9/12/19    PULMONARY:  Full Vent Support    RENAL:  none    GI:  NPO    HEME:  trend h/h    ID:  leukocytosis  no fevers    ENDO:  ISS    DVT PROPHYLAXIS:  [x] Venodynes                                      DISPOSITION:   DNR  ICU status  Organ Donor Notified

## 2019-09-13 NOTE — DIETITIAN INITIAL EVALUATION ADULT. - OTHER INFO
74y F with hx dementia, HTN, MS, seizure disorder, presents with large Right hemisphere IPH/IVH and midline shift, ICH Score 5, NIH 38, GCS4; death imminent; there is futility of care. Pt intubated, vent mode: AC/CMV. No sedation at this time. Unable to obtain information regarding diet/wt hx at this time. Pt DNR. GI: no noted N/V, no documentation regarding last BM. Skin: Gerber score 9. Unable to assess for pain level at this time 2/2 clinical status. No plans to start TF per team 2/2 pt's medical status. RD to monitor and f/u per protocol.

## 2019-09-14 NOTE — PROVIDER CONTACT NOTE (CRITICAL VALUE NOTIFICATION) - NAME OF MD/NP/PA/DO NOTIFIED:
FREDDY baldwin
MD Caldera
MD Ross
Aaron HAYES
Aaron HAYES
Casey crystal
FREDDY Perez
FREDDY Walker
neuro FREDDY hernández

## 2019-09-14 NOTE — PROVIDER CONTACT NOTE (CRITICAL VALUE NOTIFICATION) - ACTION/TREATMENT ORDERED:
no interventions at this time
culture from sinus OR specimen- MDR pseudomonas. per PA no changed to abx and she will follow up with epidemiology regarding contact isolation
calcium to be ordered
no intervention
Calcium level 6.5 PA aware.
No order to be taken at present.
none
Continue to monitor and will repeat when a-line placed
Will order Ca gluconate and continue to monitor
Continue to monitor and PA Aaron crystal

## 2019-09-15 NOTE — PROGRESS NOTE ADULT - REASON FOR ADMISSION
acute intracranial hematoma

## 2019-09-15 NOTE — PROGRESS NOTE ADULT - ASSESSMENT
ASSESSMENT:  74y Female with large Right hemisphere IPH/IVH and midline shift, ICH Score 5, NIH 38, GCS4; death imminent; there is futility of care.     PLAN:  NEURO:  no escalation of care, no blood draws  continue medication as per [employer]  HOB>30    CARDIOVASCULAR:  SBP > 65  Levo/Dopam/Neosyn gtt, cap care now. may de-escalate, but do not escalate beyond current dose 19:00 9/12/19; RNs, PAs, family aware as per family Robertbi    DVT PROPHYLAXIS:  [x] Jeff                                      DISPOSITION:   DNR  ICU status  Organ Donor Notified

## 2019-09-15 NOTE — PROGRESS NOTE ADULT - SUBJECTIVE AND OBJECTIVE BOX
HPI:  Pt is 75yo female, PMH: dementia, HTN, MS, seizure disorder, had a seizure today, found unresponsive, brought to OSH, intubated, HCT: Large IPH R hemisphere with midline shift to the left, GCS 4.  Pt was transferred to [facility] as per family wishes, on arrival with no sedation : b/l pupils 5-6mm fixed non-reactive, no corneal b/l, no gag, not overbreathing vent, no withdrawal to noxious in all 4 extr, unable to obtain BP, trace pulse on palpation in carotid, code blue was called, FS 230s, maxed out Levophed/Dopamine/Neosynephrine drips, bolus IVF 2L, attempted R femoral [place] unsuccessfully, Right axillary A-line placed successfully, has 4 IV peripheral line, + garcia placed at OSH, HCT from outside hospital was reviewed with Dr. Kitchen. Based on HCT (from outside hospital) and patient's clinical exam- consistent with brain death.  Pt is unstable to get Head CT at [facility].    ICH Score 5, GCS 4, NIH 38 (11 Sep 2019 22:42)          Hospital Course:   9/12 BD#2, intubated, full vent support, no sedation, full code, Right femoral Central line, Right axillary A-line, +leukocytosis       Vital Signs Last 24 Hrs  T(C): 35.7 (11 Sep 2019 21:00), Max: 35.7 (11 Sep 2019 21:00)  T(F): 96.3 (11 Sep 2019 21:00), Max: 96.3 (11 Sep 2019 21:00)  HR: 156 (12 Sep 2019 04:00) (150 - 164)  BP: 55/34 (11 Sep 2019 21:20) (55/34 - 119/93)  BP(mean): 39 (11 Sep 2019 21:20) (22 - 90)  RR: 16 (12 Sep 2019 04:00) (16 - 18)  SpO2: 97% (12 Sep 2019 04:00) (89% - 99%)    I&O's Detail    11 Sep 2019 07:01  -  12 Sep 2019 04:19  --------------------------------------------------------  IN:    DOPamine Infusion: 469 mL    IV PiggyBack: 548 mL    norepinephrine Infusion: 1143 mL    phenylephrine   Infusion: 350 mL    sodium chloride 0.9%: 160 mL  Total IN: 2670 mL    OUT:    Indwelling Catheter - Urethral: 735 mL  Total OUT: 735 mL    Total NET: 1935 mL        I&O's Summary    11 Sep 2019 07:01  -  12 Sep 2019 04:19  --------------------------------------------------------  IN: 2670 mL / OUT: 735 mL / NET: 1935 mL        PHYSICAL EXAM:  Neurological:  intubated, off sedation,   does not open eyes, does not follow commands,   b/l pupils 5-6mm, fixed non-reactive, no corneal, no gag, not overbreathing the vent,  no withdrawal in all 4 extr to noxious,  no grimacing to noxious    Incision/Wound:    DEVICE/DRAIN DRESSING:    TUBES/LINES:  [x] CVC  [x] A-line  [] Lumbar Drain  [] Ventriculostomy  [] Other    DIET:  [x] NPO  [] Mechanical  [] Tube feeds    LABS:             Labs to be reviewed during the day rounds      CAPILLARY BLOOD GLUCOSE      POCT Blood Glucose.: 231 mg/dL (11 Sep 2019 20:58)      Drug Levels: [] N/A    CSF Analysis: [] N/A      Allergies    ciprofloxacin (Unknown)    Intolerances      MEDICATIONS:  Antibiotics:    Neuro:  levETIRAcetam  IVPB 1000 milliGRAM(s) IV Intermittent every 12 hours  ondansetron Injectable 4 milliGRAM(s) IV Push every 6 hours PRN    Anticoagulation:    OTHER:  chlorhexidine 0.12% Liquid 15 milliLiter(s) Oral Mucosa every 12 hours  dextrose 40% Gel 15 Gram(s) Oral once PRN  dextrose 50% Injectable 12.5 Gram(s) IV Push once  DOPamine Infusion 1 MICROgram(s)/kG/Min IV Continuous <Continuous>  glucagon  Injectable 1 milliGRAM(s) IntraMuscular once PRN  insulin lispro (HumaLOG) corrective regimen sliding scale   SubCutaneous three times a day before meals  norepinephrine Infusion 0.05 MICROgram(s)/kG/Min IV Continuous <Continuous>  phenylephrine    Infusion 1 MICROgram(s)/kG/Min IV Continuous <Continuous>    IVF:  dextrose 5%. 1000 milliLiter(s) IV Continuous <Continuous>    CULTURES:    RADIOLOGY & ADDITIONAL TESTS:      ASSESSMENT:  74y Female with large Right hemisphere IPH/IVH and midline shift, ICH Score 5, NIH 38, GCS4    INTRACRANIAL HEMORRHAGE  Handoff  Seizure disorder  Dementia  Multiple sclerosis  HTN (hypertension)  Suspected deep vein thrombosis (DVT)  Intracranial bleed  Insertion, arterial line, percutaneous      PLAN:  NEURO:  Neuro Checks q1h  Keppra 1q12  HOB>30      CARDIOVASCULAR:  SBO   Levo/Dopam/Neosyn gtt    PULMONARY:  Full Vent Support    RENAL:  IVF    GI:  NPO    HEME:  trend h/h    ID:  leukocytosis  no fevers    ENDO:  ISS    DVT PROPHYLAXIS:  [x] Venodynes                                [] Heparin/Lovenox      DISPOSITION:   full code  ICU status  Organ Donor Notified    Assessment:  Present when checked    []  GCS  E   V  M     Heart Failure: []Acute, [] acute on chronic , []chronic  Heart Failure:  [] Diastolic (HFpEF), [] Systolic (HFrEF), []Combined (HFpEF and HFrEF), [] RHF, [] Pulm HTN, [] Other    [] LONG, [] ATN, [] AIN, [] other  [] CKD1, [] CKD2, [] CKD 3, [] CKD 4, [] CKD 5, []ESRD    Encephalopathy: [] Metabolic, [] Hepatic, [] toxic, [x] Neurological, [] Other    Abnormal Nurtitional Status: [] malnurtition (see nutrition note), [ ]underweight: BMI < 19, [] morbid obesity: BMI >40, [] Cachexia    [] Sepsis  [] hypovolemic shock,[] cardiogenic shock, [] hemorrhagic shock, [] neuogenic shock  [] Acute Respiratory Failure  [x]Cerebral edema, [x] Brain compression/ herniation,   [] Functional quadriplegia  [] Acute blood loss anemia
Neurology Follow up note    Name  DEBORAH MOREIRA    HPI:  Pt is 73yo female, PMH: dementia, HTN, MS, seizure disorder, had a seizure today, found unresponsive, brought to OSH, intubated, HCT: Large IPH R hemisphere with midline shift to the left, GCS 4.  Pt was transferred to [facility] as per family wishes, on arrival with no sedation : b/l pupils 5-6mm fixed non-reactive, no corneal b/l, no gag, not overbreathing vent, no withdrawal to noxious in all 4 extr, unable to obtain BP, trace pulse on palpation in carotid, code blue was called, FS 230s, maxed out Levophed/Dopamine/Neosynephrine drips, bolus IVF 2L, attempted R femoral [place] unsuccessfully, Right axillary A-line placed successfully, has 4 IV peripheral line, + garcia placed at OSH, HCT from outside hospital was reviewed with Dr. Kitchen. Based on HCT (from outside hospital) and patient's clinical exam- consistent with brain death.  Pt is unstable to get Head CT at [facility].    ICH Score 5, GCS 4, NIH 38 (11 Sep 2019 22:42)      Interval History - no evidence of neuro function         REVIEW OF SYSTEMS    Vital Signs Last 24 Hrs  T(C): 38.4 (13 Sep 2019 04:52), Max: 38.4 (13 Sep 2019 04:52)  T(F): 101.1 (13 Sep 2019 04:52), Max: 101.1 (13 Sep 2019 04:52)  HR: 132 (13 Sep 2019 07:00) (132 - 156)  BP: --  BP(mean): 40 (12 Sep 2019 09:00) (40 - 40)  RR: 10 (13 Sep 2019 07:00) (9 - 116)  SpO2: 98% (13 Sep 2019 04:00) (95% - 100%)    Physical Exam-     Mental Status- coma    Cranial Nerves- no pupil reaction        Motor- no movement to stimuli    Reflexes- absent      Vascular -    Medications  chlorhexidine 0.12% Liquid 15 milliLiter(s) Oral Mucosa every 12 hours  dextrose 40% Gel 15 Gram(s) Oral once PRN  dextrose 5%. 1000 milliLiter(s) IV Continuous <Continuous>  dextrose 50% Injectable 12.5 Gram(s) IV Push once  DOPamine Infusion 1 MICROgram(s)/kG/Min IV Continuous <Continuous>  glucagon  Injectable 1 milliGRAM(s) IntraMuscular once PRN  insulin lispro (HumaLOG) corrective regimen sliding scale   SubCutaneous every 6 hours  levETIRAcetam  IVPB 1000 milliGRAM(s) IV Intermittent every 12 hours  norepinephrine Infusion 2.8 MICROgram(s)/kG/Min IV Continuous <Continuous>  ondansetron Injectable 4 milliGRAM(s) IV Push every 6 hours PRN  phenylephrine    Infusion 1 MICROgram(s)/kG/Min IV Continuous <Continuous>      Lab      Radiology    Assessment- No evidence of neuro function    Plan as per neurosurgery
=================================  NEUROCRITICAL CARE ATTENDING NOTE  =================================    DEBORAH MOREIRA   MRN-[id number]  Summary:  74y/F  HPI:  Pt is 75yo female, PMH: dementia, HTN, MS, seizure disorder, had a seizure today, found unresponsive, brought to OSH, intubated, HCT: Large IPH R hemisphere with midline shift to the left, GCS 4.  Pt was transferred to [facility] as per family wishes, on arrival with no sedation : b/l pupils 5-6mm fixed non-reactive, no corneal b/l, no gag, not overbreathing vent, no withdrawal to noxious in all 4 extr, unable to obtain BP, trace pulse on palpation in carotid, code blue was called, FS 230s, maxed out Levophed/Dopamine/Neosynephrine drips, bolus IVF 2L, attempted R femoral [place] unsuccessfully, Right axillary A-line placed successfully, has 4 IV peripheral line, + garcia placed at OSH, HCT from outside hospital was reviewed with Dr. Kitchen. Based on HCT (from outside hospital) and patient's clinical exam- consistent with brain death.  Pt is unstable to get Head CT at [facility].    ICH Score 5, GCS 4, NIH 38 (11 Sep 2019 22:42)    Overnight Events:  cont current care, no escalation    PHYSICAL EXAMINATION  T(C): 37.6 (09-14 @ 04:48), Max: 37.9 (09-13 @ 10:44)  HR: 130 (09-14 @ 08:00) (120 - 133)  BP: --  RR: 10 (09-14 @ 08:00) (9 - 10)  SpO2: --  CVP(mm Hg): --    LABS:  CAPILLARY BLOOD GLUCOSE                              6.0    27.03 )-----------( 103      ( 14 Sep 2019 05:48 )             19.4     09-14    148<H>  |  126<H>  |  24<H>  ----------------------------<  158<H>  4.0   |  13<L>  |  0.55    Ca    6.3<LL>      14 Sep 2019 05:48  Phos  3.9     09-14  Mg     2.2     09-14 09-13 @ 07:01 - 09-14 @ 07:00  --------------------------------------------------------  IN: 8312 mL / OUT: 1120 mL / NET: 7192 mL    09-14 @ 07:01 - 09-14 @ 08:38  --------------------------------------------------------  IN: 338 mL / OUT: 70 mL / NET: 268 mL    MEDICATIONS:  MEDICATIONS  (STANDING):  acetaminophen  Suppository .. 650 milliGRAM(s) Rectal once  chlorhexidine 0.12% Liquid 15 milliLiter(s) Oral Mucosa every 12 hours  dextrose 5%. 1000 milliLiter(s) (50 mL/Hr) IV Continuous <Continuous>  DOPamine Infusion 1 MICROgram(s)/kG/Min (3.525 mL/Hr) IV Continuous <Continuous>  levETIRAcetam  IVPB 1000 milliGRAM(s) IV Intermittent every 12 hours  norepinephrine Infusion 2.8 MICROgram(s)/kG/Min (246.75 mL/Hr) IV Continuous <Continuous>  phenylephrine    Infusion 1 MICROgram(s)/kG/Min (35.25 mL/Hr) IV Continuous <Continuous>    MEDICATIONS  (PRN):  glucagon  Injectable 1 milliGRAM(s) IntraMuscular once PRN Glucose LESS THAN 70 milligrams/deciliter  ondansetron Injectable 4 milliGRAM(s) IV Push every 6 hours PRN Nausea and/or Vomiting      comatose  pupils 8mm fixed  no motor response
=================================  NEUROCRITICAL CARE ATTENDING NOTE  =================================    DEBORAH MOREIRA   MRN-[id number]  Summary:  74y/F  HPI:  Pt is 75yo female, PMH: dementia, HTN, MS, seizure disorder, had a seizure today, found unresponsive, brought to OSH, intubated, HCT: Large IPH R hemisphere with midline shift to the left, GCS 4.  Pt was transferred to [facility] as per family wishes, on arrival with no sedation : b/l pupils 5-6mm fixed non-reactive, no corneal b/l, no gag, not overbreathing vent, no withdrawal to noxious in all 4 extr, unable to obtain BP, trace pulse on palpation in carotid, code blue was called, FS 230s, maxed out Levophed/Dopamine/Neosynephrine drips, bolus IVF 2L, attempted R femoral [place] unsuccessfully, Right axillary A-line placed successfully, has 4 IV peripheral line, + garcia placed at OSH, HCT from outside hospital was reviewed with Dr. Kitchen. Based on HCT (from outside hospital) and patient's clinical exam- consistent with brain death.  Pt is unstable to get Head CT at [facility].    ICH Score 5, GCS 4, NIH 38 (11 Sep 2019 22:42)      Overnight Events:  Denies HA/N/V/Dizziness.      PHYSICAL EXAMINATION  T(C): 38.4 (09-13 @ 04:52), Max: 38.4 (09-13 @ 04:52)  HR: 132 (09-13 @ 07:00) (132 - 156)  BP: --  RR: 10 (09-13 @ 07:00) (9 - 116)  SpO2: 98% (09-13 @ 04:00) (95% - 100%)  CVP(mm Hg): --    LABS:  CAPILLARY BLOOD GLUCOSE      POCT Blood Glucose.: 95 mg/dL (13 Sep 2019 05:42)  POCT Blood Glucose.: 100 mg/dL (12 Sep 2019 22:39)  POCT Blood Glucose.: 148 mg/dL (12 Sep 2019 16:38)  POCT Blood Glucose.: 207 mg/dL (12 Sep 2019 11:12)                          9.6    29.36 )-----------( 153      ( 12 Sep 2019 05:38 )             30.0     09-12    151<H>  |  122<H>  |  24<H>  ----------------------------<  232<H>  4.1   |  15<L>  |  0.43<L>    Ca    6.2<LL>      12 Sep 2019 10:34  Phos  5.7     09-12  Mg     3.2     09-12    TPro  4.1<L>  /  Alb  2.2<L>  /  TBili  0.4  /  DBili  <0.2  /  AST  2430<H>  /  ALT  2778<H>  /  AlkPhos  68  09-12 09-12 @ 07:01 - 09-13 @ 07:00  --------------------------------------------------------  IN: 56047.6 mL / OUT: 1427 mL / NET: 9327.6 mL    09-13 @ 07:01 - 09-13 @ 07:55  --------------------------------------------------------  IN: 338 mL / OUT: 35 mL / NET: 303 mL        MEDICATIONS:  MEDICATIONS  (STANDING):  chlorhexidine 0.12% Liquid 15 milliLiter(s) Oral Mucosa every 12 hours  dextrose 5%. 1000 milliLiter(s) (50 mL/Hr) IV Continuous <Continuous>  dextrose 50% Injectable 12.5 Gram(s) IV Push once  DOPamine Infusion 1 MICROgram(s)/kG/Min (3.525 mL/Hr) IV Continuous <Continuous>  insulin lispro (HumaLOG) corrective regimen sliding scale   SubCutaneous every 6 hours  levETIRAcetam  IVPB 1000 milliGRAM(s) IV Intermittent every 12 hours  norepinephrine Infusion 2.8 MICROgram(s)/kG/Min (246.75 mL/Hr) IV Continuous <Continuous>  phenylephrine    Infusion 1 MICROgram(s)/kG/Min (35.25 mL/Hr) IV Continuous <Continuous>    MEDICATIONS  (PRN):  dextrose 40% Gel 15 Gram(s) Oral once PRN Blood Glucose LESS THAN 70 milliGRAM(s)/deciliter  glucagon  Injectable 1 milliGRAM(s) IntraMuscular once PRN Glucose LESS THAN 70 milligrams/deciliter  ondansetron Injectable 4 milliGRAM(s) IV Push every 6 hours PRN Nausea and/or Vomiting    comatose  pupils 8mm fixed  no motor response
=================================  NEUROCRITICAL CARE ATTENDING NOTE  =================================    DEBORAH MOREIRA   MRN-[id number]  Summary:  74y/F  HPI:  Pt is 75yo female, PMH: dementia, HTN, MS, seizure disorder, had a seizure today, found unresponsive, brought to OSH, intubated, HCT: Large IPH R hemisphere with midline shift to the left, GCS 4.  Pt was transferred to [facility] as per family wishes, on arrival with no sedation : b/l pupils 5-6mm fixed non-reactive, no corneal b/l, no gag, not overbreathing vent, no withdrawal to noxious in all 4 extr, unable to obtain BP, trace pulse on palpation in carotid, code blue was called, FS 230s, maxed out Levophed/Dopamine/Neosynephrine drips, bolus IVF 2L, attempted R femoral [place] unsuccessfully, Right axillary A-line placed successfully, has 4 IV peripheral line, + garcia placed at OSH, HCT from outside hospital was reviewed with Dr. Kitchen. Based on HCT (from outside hospital) and patient's clinical exam- consistent with brain death.  Pt is unstable to get Head CT at [facility].    ICH Score 5, GCS 4, NIH 38 (11 Sep 2019 22:42)      PHYSICAL EXAMINATION  T(C): 36.9 (09-15 @ 09:04), Max: 37.2 (09-14 @ 14:07)  HR: 106 (09-15 @ 11:00) (106 - 122)  BP: --  RR: 12 (09-15 @ 11:00) (9 - 14)  SpO2: --  CVP(mm Hg): --    LABS:  CAPILLARY BLOOD GLUCOSE                              6.0    27.03 )-----------( 103      ( 14 Sep 2019 05:48 )             19.4     09-14    148<H>  |  126<H>  |  24<H>  ----------------------------<  158<H>  4.0   |  13<L>  |  0.55    Ca    6.3<LL>      14 Sep 2019 05:48  Phos  3.9     09-14  Mg     2.2     09-14    TPro  3.1<L>  /  Alb  1.5<L>  /  TBili  0.4  /  DBili  0.2  /  AST  2272<H>  /  ALT  4691<H>  /  AlkPhos  91  09-14 09-14 @ 07:01  -  09-15 @ 07:00  --------------------------------------------------------  IN: 7469.2 mL / OUT: 2715 mL / NET: 4754.2 mL    09-15 @ 07:01  -  09-15 @ 12:29  --------------------------------------------------------  IN: 1380 mL / OUT: 230 mL / NET: 1150 mL        MEDICATIONS:  MEDICATIONS  (STANDING):  acetaminophen  Suppository .. 650 milliGRAM(s) Rectal once  chlorhexidine 0.12% Liquid 15 milliLiter(s) Oral Mucosa every 12 hours  DOPamine Infusion 1 MICROgram(s)/kG/Min (3.525 mL/Hr) IV Continuous <Continuous>  norepinephrine Infusion 2.8 MICROgram(s)/kG/Min (246.75 mL/Hr) IV Continuous <Continuous>  phenylephrine    Infusion 1 MICROgram(s)/kG/Min (35.25 mL/Hr) IV Continuous <Continuous>    MEDICATIONS  (PRN):  glucagon  Injectable 1 milliGRAM(s) IntraMuscular once PRN Glucose LESS THAN 70 milligrams/deciliter  ondansetron Injectable 4 milliGRAM(s) IV Push every 6 hours PRN Nausea and/or Vomiting        comatose  R pupils 8mm fixed; L pupil 5 mm fixed  no motor response
HPI:  Pt is 73yo female, PMH: dementia, HTN, MS, seizure disorder, had a seizure today, found unresponsive, brought to OSH, intubated, HCT: Large IPH R hemisphere with midline shift to the left, GCS 4.  Pt was transferred to [facility] as per family wishes, on arrival with no sedation : b/l pupils 5-6mm fixed non-reactive, no corneal b/l, no gag, not overbreathing vent, no withdrawal to noxious in all 4 extr, unable to obtain BP, trace pulse on palpation in carotid, code blue was called, FS 230s, maxed out Levophed/Dopamine/Neosynephrine drips, bolus IVF 2L, attempted R femoral [place] unsuccessfully, Right axillary A-line placed successfully, has 4 IV peripheral line, + garcia placed at OSH, HCT from outside hospital was reviewed with Dr. Kitchen. Based on HCT (from outside hospital) and patient's clinical exam- consistent with brain death.  Pt is unstable to get Head CT at [facility].    ICH Score 5, GCS 4, NIH 38 (11 Sep 2019 22:42)    OVERNIGHT EVENTS: YOSI overnight    Hospital Course:   9/12 BD#2, intubated, full vent support, no sedation, full code, Right femoral Central line, Right axillary A-line, +leukocytosis   9/13: patient made DNR/DNI. Will continue care with no escalation of care.   9/14: YOSI overnight.  9/15 Yosi overnight       ICU Vital Signs Last 24 Hrs  T(C): 36.8 (15 Sep 2019 01:20), Max: 37.6 (14 Sep 2019 04:48)  T(F): 98.3 (15 Sep 2019 01:20), Max: 99.7 (14 Sep 2019 04:48)  HR: 110 (15 Sep 2019 03:00) (110 - 133)  BP: --  BP(mean): --  ABP: 98/54 (15 Sep 2019 03:00) (90/48 - 112/62)  ABP(mean): 72 (15 Sep 2019 03:00) (62 - 82)  RR: 12 (15 Sep 2019 03:00) (9 - 14)  SpO2: --      PHYSICAL EXAM:  Neurological:  intubated  does not open eyes, does not follow commands,   b/l pupils 5-6mm, fixed non-reactive, no corneal, no gag, not overbreathing the vent,  no withdrawal in all 4 extr to noxious,  no grimacing to noxious      TUBES/LINES:  [] CVC  [] A-line  [] Lumbar Drain  [] Ventriculostomy  [] Other    DIET:  [x] NPO  [] Mechanical  [] Tube feeds    LABS:           labs to be reviewed in AM          CAPILLARY BLOOD GLUCOSE          Drug Levels: [] N/A    CSF Analysis: [] N/A      Allergies    ciprofloxacin (Unknown)    Intolerances      MEDICATIONS:  Antibiotics:    Neuro:  acetaminophen  Suppository .. 650 milliGRAM(s) Rectal once  levETIRAcetam  IVPB 1000 milliGRAM(s) IV Intermittent every 12 hours  ondansetron Injectable 4 milliGRAM(s) IV Push every 6 hours PRN    Anticoagulation:    OTHER:  chlorhexidine 0.12% Liquid 15 milliLiter(s) Oral Mucosa every 12 hours  DOPamine Infusion 1 MICROgram(s)/kG/Min IV Continuous <Continuous>  glucagon  Injectable 1 milliGRAM(s) IntraMuscular once PRN  norepinephrine Infusion 2.8 MICROgram(s)/kG/Min IV Continuous <Continuous>  phenylephrine    Infusion 1 MICROgram(s)/kG/Min IV Continuous <Continuous>    IVF:  dextrose 5%. 1000 milliLiter(s) IV Continuous <Continuous>    CULTURES:    RADIOLOGY & ADDITIONAL TESTS:      ASSESSMENT:  74y Female with large Right hemisphere IPH/IVH and midline shift, ICH Score 5, NIH 38, GCS4    PLAN:  - neuro checks  - vitals checks  - pain control  - cont Levo/Dopamine/Phenylephrine  - full vent support  - DNR/DNI    d/w Dr. Werner Dr.     Assessment:  Present when checked    []  GCS  E   V  M     Heart Failure: []Acute, [] acute on chronic , []chronic  Heart Failure:  [] Diastolic (HFpEF), [] Systolic (HFrEF), []Combined (HFpEF and HFrEF), [] RHF, [] Pulm HTN, [] Other    [] LONG, [] ATN, [] AIN, [] other  [] CKD1, [] CKD2, [] CKD 3, [] CKD 4, [] CKD 5, []ESRD    Encephalopathy: [] Metabolic, [] Hepatic, [] toxic, [x] Neurological, [] Other    Abnormal Nurtitional Status: [] malnurtition (see nutrition note), [ ]underweight: BMI < 19, [] morbid obesity: BMI >40, [] Cachexia    [] Sepsis  [] hypovolemic shock,[] cardiogenic shock, [] hemorrhagic shock, [] neuogenic shock  [] Acute Respiratory Failure  [x]Cerebral edema, [x] Brain compression/ herniation,   [] Functional quadriplegia  [] Acute blood loss anemia
HPI:  Pt is 75yo female, PMH: dementia, HTN, MS, seizure disorder, had a seizure today, found unresponsive, brought to OSH, intubated, HCT: Large IPH R hemisphere with midline shift to the left, GCS 4.  Pt was transferred to [facility] as per family wishes, on arrival with no sedation : b/l pupils 5-6mm fixed non-reactive, no corneal b/l, no gag, not overbreathing vent, no withdrawal to noxious in all 4 extr, unable to obtain BP, trace pulse on palpation in carotid, code blue was called, FS 230s, maxed out Levophed/Dopamine/Neosynephrine drips, bolus IVF 2L, attempted R femoral [place] unsuccessfully, Right axillary A-line placed successfully, has 4 IV peripheral line, + garcia placed at OSH, HCT from outside hospital was reviewed with Dr. Kitchen. Based on HCT (from outside hospital) and patient's clinical exam- consistent with brain death.  Pt is unstable to get Head CT at [facility].    ICH Score 5, GCS 4, NIH 38 (11 Sep 2019 22:42)    OVERNIGHT EVENTS: LARRY overnight    Hospital Course:   9/12 BD#2, intubated, full vent support, no sedation, full code, Right femoral Central line, Right axillary A-line, +leukocytosis   9/13: patient made DNR/DNI. Will continue care with no escalation of care.   9/14: LARRY overnight.     Vital Signs Last 24 Hrs  T(C): 37.6 (14 Sep 2019 04:48), Max: 37.7 (13 Sep 2019 14:45)  T(F): 99.7 (14 Sep 2019 04:48), Max: 99.8 (13 Sep 2019 14:45)  HR: 122 (14 Sep 2019 13:00) (120 - 133)  BP: --  BP(mean): --  RR: 12 (14 Sep 2019 13:00) (9 - 12)  SpO2: --    I&O's Detail    13 Sep 2019 07:01  -  14 Sep 2019 07:00  --------------------------------------------------------  IN:    DOPamine Infusion: 1344 mL    IV PiggyBack: 200 mL    norepinephrine Infusion: 4224 mL    phenylephrine   Infusion: 2544 mL  Total IN: 8312 mL    OUT:    Indwelling Catheter - Urethral: 1120 mL  Total OUT: 1120 mL    Total NET: 7192 mL      14 Sep 2019 07:01  -  14 Sep 2019 13:37  --------------------------------------------------------  IN:    DOPamine Infusion: 338 mL    norepinephrine Infusion: 1056 mL    phenylephrine   Infusion: 636 mL  Total IN: 2030 mL    OUT:    Indwelling Catheter - Urethral: 605 mL  Total OUT: 605 mL    Total NET: 1425 mL        I&O's Summary    13 Sep 2019 07:01  -  14 Sep 2019 07:00  --------------------------------------------------------  IN: 8312 mL / OUT: 1120 mL / NET: 7192 mL    14 Sep 2019 07:01  -  14 Sep 2019 13:37  --------------------------------------------------------  IN: 2030 mL / OUT: 605 mL / NET: 1425 mL        PHYSICAL EXAM:  Neurological:  intubated  does not open eyes, does not follow commands,   b/l pupils 5-6mm, fixed non-reactive, no corneal, no gag, not overbreathing the vent,  no withdrawal in all 4 extr to noxious,  no grimacing to noxious      TUBES/LINES:  [] CVC  [] A-line  [] Lumbar Drain  [] Ventriculostomy  [] Other    DIET:  [] NPO  [] Mechanical  [] Tube feeds    LABS:                        6.0    27.03 )-----------( 103      ( 14 Sep 2019 05:48 )             19.4     09-14    148<H>  |  126<H>  |  24<H>  ----------------------------<  158<H>  4.0   |  13<L>  |  0.55    Ca    6.3<LL>      14 Sep 2019 05:48  Phos  3.9     09-14  Mg     2.2     09-14    TPro  3.1<L>  /  Alb  1.5<L>  /  TBili  0.4  /  DBili  0.2  /  AST  2272<H>  /  ALT  4691<H>  /  AlkPhos  91  09-14            CAPILLARY BLOOD GLUCOSE          Drug Levels: [] N/A    CSF Analysis: [] N/A      Allergies    ciprofloxacin (Unknown)    Intolerances      MEDICATIONS:  Antibiotics:    Neuro:  acetaminophen  Suppository .. 650 milliGRAM(s) Rectal once  levETIRAcetam  IVPB 1000 milliGRAM(s) IV Intermittent every 12 hours  ondansetron Injectable 4 milliGRAM(s) IV Push every 6 hours PRN    Anticoagulation:    OTHER:  chlorhexidine 0.12% Liquid 15 milliLiter(s) Oral Mucosa every 12 hours  DOPamine Infusion 1 MICROgram(s)/kG/Min IV Continuous <Continuous>  glucagon  Injectable 1 milliGRAM(s) IntraMuscular once PRN  norepinephrine Infusion 2.8 MICROgram(s)/kG/Min IV Continuous <Continuous>  phenylephrine    Infusion 1 MICROgram(s)/kG/Min IV Continuous <Continuous>    IVF:  dextrose 5%. 1000 milliLiter(s) IV Continuous <Continuous>    CULTURES:    RADIOLOGY & ADDITIONAL TESTS:      ASSESSMENT:  74y Female with large Right hemisphere IPH/IVH and midline shift, ICH Score 5, NIH 38, GCS4    PLAN:  - neuro checks  - vitals checks  - pain control  - cont Levo/Dopamine/Phenylephrine  - full vent support  - DNR/DNI    d/w Dr. Werner Dr.     Assessment:  Present when checked    []  GCS  E   V  M     Heart Failure: []Acute, [] acute on chronic , []chronic  Heart Failure:  [] Diastolic (HFpEF), [] Systolic (HFrEF), []Combined (HFpEF and HFrEF), [] RHF, [] Pulm HTN, [] Other    [] LONG, [] ATN, [] AIN, [] other  [] CKD1, [] CKD2, [] CKD 3, [] CKD 4, [] CKD 5, []ESRD    Encephalopathy: [] Metabolic, [] Hepatic, [] toxic, [x] Neurological, [] Other    Abnormal Nurtitional Status: [] malnurtition (see nutrition note), [ ]underweight: BMI < 19, [] morbid obesity: BMI >40, [] Cachexia    [] Sepsis  [] hypovolemic shock,[] cardiogenic shock, [] hemorrhagic shock, [] neuogenic shock  [] Acute Respiratory Failure  [x]Cerebral edema, [x] Brain compression/ herniation,   [] Functional quadriplegia  [] Acute blood loss anemia
HPI:  Pt is 75yo female, PMH: dementia, HTN, MS, seizure disorder, had a seizure today, found unresponsive, brought to OSH, intubated, HCT: Large IPH R hemisphere with midline shift to the left, GCS 4.  Pt was transferred to [facility] as per family wishes, on arrival with no sedation : b/l pupils 5-6mm fixed non-reactive, no corneal b/l, no gag, not overbreathing vent, no withdrawal to noxious in all 4 extr, unable to obtain BP, trace pulse on palpation in carotid, code blue was called, FS 230s, maxed out Levophed/Dopamine/Neosynephrine drips, bolus IVF 2L, attempted R femoral Dayami unsuccessfully, Right axillary A-line placed successfully, has 4 IV peripheral line, + garcia placed at OSH, HCT from outside hospital was reviewed with Dr. Kitchen. Based on HCT (from outside hospital) and patient's clinical exam- consistent with brain death.  Pt is unstable to get Head CT at [facility].    ICH Score 5, GCS 4, NIH 38 (11 Sep 2019 22:42)      Hospital Course:   9/12 BD#2, intubated, full vent support, no sedation, full code, Right femoral Central line, Right axillary A-line, +leukocytosis       Vital Signs Last 24 Hrs  T(C): 35.7 (11 Sep 2019 21:00), Max: 35.7 (11 Sep 2019 21:00)  T(F): 96.3 (11 Sep 2019 21:00), Max: 96.3 (11 Sep 2019 21:00)  HR: 156 (12 Sep 2019 04:00) (150 - 164)  BP: 55/34 (11 Sep 2019 21:20) (55/34 - 119/93)  BP(mean): 39 (11 Sep 2019 21:20) (22 - 90)  RR: 16 (12 Sep 2019 04:00) (16 - 18)  SpO2: 97% (12 Sep 2019 04:00) (89% - 99%)    I&O's Detail    11 Sep 2019 07:01  -  12 Sep 2019 04:19  --------------------------------------------------------  IN:    DOPamine Infusion: 469 mL    IV PiggyBack: 548 mL    norepinephrine Infusion: 1143 mL    phenylephrine   Infusion: 350 mL    sodium chloride 0.9%: 160 mL  Total IN: 2670 mL    OUT:    Indwelling Catheter - Urethral: 735 mL  Total OUT: 735 mL    Total NET: 1935 mL        I&O's Summary    11 Sep 2019 07:01  -  12 Sep 2019 04:19  --------------------------------------------------------  IN: 2670 mL / OUT: 735 mL / NET: 1935 mL        PHYSICAL EXAM:  Neurological:  intubated, off sedation,   does not open eyes, does not follow commands,   b/l pupils 5-6mm, fixed non-reactive, no corneal, no gag, not overbreathing the vent,  no withdrawal in all 4 extr to noxious,  no grimacing to noxious    Incision/Wound:    DEVICE/DRAIN DRESSING:    TUBES/LINES:  [x] CVC  [x] A-line  [] Lumbar Drain  [] Ventriculostomy  [] Other    DIET:  [x] NPO  [] Mechanical  [] Tube feeds    LABS:             Labs to be reviewed during the day rounds      CAPILLARY BLOOD GLUCOSE      POCT Blood Glucose.: 231 mg/dL (11 Sep 2019 20:58)      Drug Levels: [] N/A    CSF Analysis: [] N/A      Allergies    ciprofloxacin (Unknown)    Intolerances      MEDICATIONS:  Antibiotics:    Neuro:  levETIRAcetam  IVPB 1000 milliGRAM(s) IV Intermittent every 12 hours  ondansetron Injectable 4 milliGRAM(s) IV Push every 6 hours PRN    Anticoagulation:    OTHER:  chlorhexidine 0.12% Liquid 15 milliLiter(s) Oral Mucosa every 12 hours  dextrose 40% Gel 15 Gram(s) Oral once PRN  dextrose 50% Injectable 12.5 Gram(s) IV Push once  DOPamine Infusion 1 MICROgram(s)/kG/Min IV Continuous <Continuous>  glucagon  Injectable 1 milliGRAM(s) IntraMuscular once PRN  insulin lispro (HumaLOG) corrective regimen sliding scale   SubCutaneous three times a day before meals  norepinephrine Infusion 0.05 MICROgram(s)/kG/Min IV Continuous <Continuous>  phenylephrine    Infusion 1 MICROgram(s)/kG/Min IV Continuous <Continuous>    IVF:  dextrose 5%. 1000 milliLiter(s) IV Continuous <Continuous>    CULTURES:    RADIOLOGY & ADDITIONAL TESTS:

## 2019-09-15 NOTE — DISCHARGE NOTE FOR THE EXPIRED PATIENT - HOSPITAL COURSE
HPI:  Pt is 75yo female, PMH: dementia, HTN, MS, seizure disorder, had a seizure today, found unresponsive, brought to OSH, intubated, HCT: Large IPH R hemisphere with midline shift to the left, GCS 4.  Pt was transferred to [facility] as per family wishes, on arrival with no sedation : b/l pupils 5-6mm fixed non-reactive, no corneal b/l, no gag, not overbreathing vent, no withdrawal to noxious in all 4 extr, unable to obtain BP, trace pulse on palpation in carotid, code blue was called, FS 230s, maxed out Levophed/Dopamine/Neosynephrine drips, bolus IVF 2L, attempted R femoral Dayami unsuccessfully, Right axillary A-line placed successfully, has 4 IV peripheral line, + garcia placed at OSH, HCT from outside hospital was reviewed with Dr. Kitchen. Based on HCT (from outside hospital) and patient's clinical exam- consistent with brain death.  Pt is unstable to get Head CT at [facility].    ICH Score 5, GCS 4, NIH 38 (11 Sep 2019 22:42)    OVERNIGHT EVENTS: YOSI overnight    Hospital Course:   9/12 BD#2, intubated, full vent support, no sedation, full code, Right femoral Central line, Right axillary A-line, +leukocytosis   9/13: patient made DNR/DNI. Will continue care with no escalation of care.   9/14: YOSI overnight.  9/15 Yosi overnight

## 2019-09-26 DIAGNOSIS — I10 ESSENTIAL (PRIMARY) HYPERTENSION: ICD-10-CM

## 2019-09-26 DIAGNOSIS — I62.9 NONTRAUMATIC INTRACRANIAL HEMORRHAGE, UNSPECIFIED: ICD-10-CM

## 2019-09-26 DIAGNOSIS — G93.6 CEREBRAL EDEMA: ICD-10-CM

## 2019-09-26 DIAGNOSIS — G35 MULTIPLE SCLEROSIS: ICD-10-CM

## 2019-09-26 DIAGNOSIS — Z88.1 ALLERGY STATUS TO OTHER ANTIBIOTIC AGENTS STATUS: ICD-10-CM

## 2019-09-26 DIAGNOSIS — G40.909 EPILEPSY, UNSPECIFIED, NOT INTRACTABLE, WITHOUT STATUS EPILEPTICUS: ICD-10-CM

## 2019-09-26 DIAGNOSIS — G93.5 COMPRESSION OF BRAIN: ICD-10-CM

## 2019-09-26 DIAGNOSIS — Z66 DO NOT RESUSCITATE: ICD-10-CM

## 2019-09-26 DIAGNOSIS — R40.20 UNSPECIFIED COMA: ICD-10-CM

## 2019-10-01 PROCEDURE — 83605 ASSAY OF LACTIC ACID: CPT

## 2019-10-01 PROCEDURE — 85610 PROTHROMBIN TIME: CPT

## 2019-10-01 PROCEDURE — 94002 VENT MGMT INPAT INIT DAY: CPT

## 2019-10-01 PROCEDURE — 82962 GLUCOSE BLOOD TEST: CPT

## 2019-10-01 PROCEDURE — 80076 HEPATIC FUNCTION PANEL: CPT

## 2019-10-01 PROCEDURE — 82330 ASSAY OF CALCIUM: CPT

## 2019-10-01 PROCEDURE — 86900 BLOOD TYPING SEROLOGIC ABO: CPT

## 2019-10-01 PROCEDURE — 36415 COLL VENOUS BLD VENIPUNCTURE: CPT

## 2019-10-01 PROCEDURE — 84100 ASSAY OF PHOSPHORUS: CPT

## 2019-10-01 PROCEDURE — 71045 X-RAY EXAM CHEST 1 VIEW: CPT

## 2019-10-01 PROCEDURE — 85730 THROMBOPLASTIN TIME PARTIAL: CPT

## 2019-10-01 PROCEDURE — 84295 ASSAY OF SERUM SODIUM: CPT

## 2019-10-01 PROCEDURE — 80061 LIPID PANEL: CPT

## 2019-10-01 PROCEDURE — 86850 RBC ANTIBODY SCREEN: CPT

## 2019-10-01 PROCEDURE — 84443 ASSAY THYROID STIM HORMONE: CPT

## 2019-10-01 PROCEDURE — 86803 HEPATITIS C AB TEST: CPT

## 2019-10-01 PROCEDURE — 85027 COMPLETE CBC AUTOMATED: CPT

## 2019-10-01 PROCEDURE — 94003 VENT MGMT INPAT SUBQ DAY: CPT

## 2019-10-01 PROCEDURE — 83735 ASSAY OF MAGNESIUM: CPT

## 2019-10-01 PROCEDURE — 80048 BASIC METABOLIC PNL TOTAL CA: CPT

## 2019-10-01 PROCEDURE — 84132 ASSAY OF SERUM POTASSIUM: CPT

## 2019-10-01 PROCEDURE — 83036 HEMOGLOBIN GLYCOSYLATED A1C: CPT

## 2019-10-01 PROCEDURE — 70450 CT HEAD/BRAIN W/O DYE: CPT

## 2019-10-01 PROCEDURE — 86901 BLOOD TYPING SEROLOGIC RH(D): CPT

## 2019-10-01 PROCEDURE — 85018 HEMOGLOBIN: CPT

## 2019-10-01 PROCEDURE — 82803 BLOOD GASES ANY COMBINATION: CPT

## 2023-04-02 NOTE — DISCHARGE NOTE FOR THE EXPIRED PATIENT - ORGAN DONOR #
[id number]
I attest my time as attending is greater than 50% of the total combined time spent on qualifying patient care activities by the PA/NP and attending.